# Patient Record
Sex: MALE | Race: WHITE | NOT HISPANIC OR LATINO | Employment: FULL TIME | URBAN - METROPOLITAN AREA
[De-identification: names, ages, dates, MRNs, and addresses within clinical notes are randomized per-mention and may not be internally consistent; named-entity substitution may affect disease eponyms.]

---

## 2017-06-12 DIAGNOSIS — I10 ESSENTIAL HYPERTENSION WITH GOAL BLOOD PRESSURE LESS THAN 140/90: ICD-10-CM

## 2017-06-13 RX ORDER — LISINOPRIL 20 MG/1
TABLET ORAL
Qty: 90 TABLET | Refills: 1 | Status: SHIPPED | OUTPATIENT
Start: 2017-06-13 | End: 2017-12-23

## 2017-06-13 RX ORDER — HYDROCHLOROTHIAZIDE 25 MG/1
25 TABLET ORAL DAILY
Qty: 90 TABLET | Refills: 1 | Status: SHIPPED | OUTPATIENT
Start: 2017-06-13 | End: 2018-01-02 | Stop reason: SINTOL

## 2017-06-13 RX ORDER — ATENOLOL 100 MG/1
TABLET ORAL
Qty: 90 TABLET | Refills: 1 | Status: SHIPPED | OUTPATIENT
Start: 2017-06-13 | End: 2017-12-23

## 2017-09-01 ENCOUNTER — OFFICE VISIT (OUTPATIENT)
Dept: URGENT CARE | Facility: URGENT CARE | Age: 67
End: 2017-09-01
Payer: COMMERCIAL

## 2017-09-01 VITALS
OXYGEN SATURATION: 98 % | SYSTOLIC BLOOD PRESSURE: 138 MMHG | DIASTOLIC BLOOD PRESSURE: 90 MMHG | RESPIRATION RATE: 16 BRPM | HEART RATE: 87 BPM

## 2017-09-01 DIAGNOSIS — T63.441A BEE STING-INDUCED ANAPHYLAXIS, ACCIDENTAL OR UNINTENTIONAL, INITIAL ENCOUNTER: ICD-10-CM

## 2017-09-01 DIAGNOSIS — T78.2XXA ANAPHYLAXIS, INITIAL ENCOUNTER: Primary | ICD-10-CM

## 2017-09-01 DIAGNOSIS — S60.469A INSECT BITE OF FINGER, INITIAL ENCOUNTER: ICD-10-CM
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
## 2017-09-01 DIAGNOSIS — R22.0 SEVERE TONGUE SWELLING: ICD-10-CM

## 2017-09-01 DIAGNOSIS — T78.2XXA BEE STING-INDUCED ANAPHYLAXIS, ACCIDENTAL OR UNINTENTIONAL, INITIAL ENCOUNTER: ICD-10-CM

## 2017-09-01 PROCEDURE — 99215 OFFICE O/P EST HI 40 MIN: CPT | Mod: 25 | Performed by: FAMILY MEDICINE

## 2017-09-01 PROCEDURE — 96372 THER/PROPH/DIAG INJ SC/IM: CPT | Performed by: FAMILY MEDICINE

## 2017-09-01 RX ORDER — METHYLPREDNISOLONE SODIUM SUCCINATE 125 MG/2ML
125 INJECTION, POWDER, LYOPHILIZED, FOR SOLUTION INTRAMUSCULAR; INTRAVENOUS ONCE
Qty: 2 ML | Refills: 0 | OUTPATIENT
Start: 2017-09-01 | End: 2017-09-01

## 2017-09-01 NOTE — NURSING NOTE
The following medication was given:     MEDICATION: epipen auto inject 0.3mg  ROUTE: IM  SITE: Thigh - Left  DOSE: 1 dose  LOT #: [id number]  :  Edd  EXPIRATION DATE:  4/18  Jaimee Maldonado MA

## 2017-09-01 NOTE — PROGRESS NOTES
HPI:Miko Gomez is a 66 year old male here today with complaint of tongue swelling    Patient was bit by a bee on the left pointer finger an hour prior to arrival.  Patient cleaned it and he tried sucking the venom off   Within 20 minutes of trying to suck the venom off, his tongue swelled up.  He denies any wheezing although he is worried that he may be getting shortness of breath  No chest pain  No fevers  No abdominal pain  No nausea or vomiting  No hives  No numbness or tingling    He tried to call the nurse line but was worried he couldn't talk so ended up driving here instead    Tongue is 3 x the normal size     Allergies   Allergen Reactions     Nkda [No Known Drug Allergies]        Past Medical History:   Diagnosis Date     HTN          Current Outpatient Prescriptions on File Prior to Visit:  atenolol (TENORMIN) 100 MG tablet TAKE ONE TABLET BY MOUTH EVERY DAY   lisinopril (PRINIVIL/ZESTRIL) 20 MG tablet TAKE ONE TABLET BY MOUTH EVERY DAY   hydrochlorothiazide (HYDRODIURIL) 25 MG tablet Take 1 tablet (25 mg) by mouth daily   aspirin (SB LOW DOSE ASA EC) 81 MG EC tablet Take 81 mg by mouth daily.   Multiple Vitamins-Minerals (CENTRUM SILVER) CHEW Take  by mouth.     No current facility-administered medications on file prior to visit.     Social History   Substance Use Topics     Smoking status: Current Every Day Smoker     Packs/day: 1.00     Years: 40.00     Types: Cigarettes     Smokeless tobacco: Never Used     Alcohol use Yes      Comment: CHERYL EVERYDAY       ROS:  10 point review of systems negative except for noted above.   No thoughts of harming self or others.     OBJECTIVE:  /90  Pulse 87  Resp 16  SpO2 98%   General:   awake, alert, and cooperative.    Head: Normocephalic, atraumatic.  Eyes: Conjunctiva clear,   Mouth: no trismus, tongue is swollen and very thick patient having difficulty talking and articulating because of the swelling  Posterior pharyngeal wall still good and  no swelling of the uvula.  Heart: Regular rate and rhythm. No murmur.  Lungs: Chest is clear; no wheezes or rales.   Abdomen: soft non-tender.  Neuro: Alert and oriented - normal speech.  MS: Using extremities freely  PSYCH:  Normal affect, normal speech  SKIN: no obvious rashes    ASSESSMENT:    ICD-10-CM    1. Anaphylaxis, initial encounter T78.2XXA methylPREDNISolone sodium succinate (SOLU-MEDROL) 125 mg/2 mL injection     METHYLPRED 125 MG SOD INJ     C ADRENALIN EPINEPHRINE 0.1 MG INJECTION   2. Severe tongue swelling R22.0    3. Bee sting-induced anaphylaxis, accidental or unintentional, initial encounter T63.441A    4. Insect bite of finger, initial encounter S60.469A     W57.XXXA        PLAN:     Patient was in severe tongue swelling with risk of respiratory distress.  Immediate administration of epipen done in clinic as well as solumedrol.  911 activated and Ambulance came to scene. We were planning on giving benadryl but ambulance have took over and stated they will give on route.  He left by ambulance. [facility] ambulance [facility]

## 2017-09-01 NOTE — Clinical Note
Hi, I'm copying you because I couldn't find Sola Tavarez in epic. But can you  review this. I put a charge on epipen injection but there was no corresponding ndc because it was an epipen injector so I just selected an ndc code that was blank and then wrote it in comments. Does that affect billing? Otherwise not sure how else to put in the epipen

## 2017-09-01 NOTE — MR AVS SNAPSHOT
After Visit Summary   9/1/2017    Miko Gomez    MRN: [id number]           Patient Information     Date Of Birth          1950        Visit Information        Provider Department      9/1/2017 6:10 PM Viviana Whitmore MD [facility]        Today's Diagnoses     Anaphylaxis, initial encounter    -  1    Severe tongue swelling        Bee sting-induced anaphylaxis, accidental or unintentional, initial encounter        Insect bite of finger, initial encounter           Follow-ups after your visit        Who to contact     If you have questions or need follow up information about today's clinic visit or your schedule please contact [facility] directly at [phone].  Normal or non-critical lab and imaging results will be communicated to you by MyChart, letter or phone within 4 business days after the clinic has received the results. If you do not hear from us within 7 days, please contact the clinic through [facility] or phone. If you have a critical or abnormal lab result, we will notify you by phone as soon as possible.  Submit refill requests through [employer] or call your pharmacy and they will forward the refill request to us. Please allow 3 business days for your refill to be completed.          Additional Information About Your Visit        MyChart Information     [employer] gives you secure access to your electronic health record. If you see a primary care provider, you can also send messages to your care team and make appointments. If you have questions, please call your primary care clinic.  If you do not have a primary care provider, please call [phone] and they will assist you.        Care EveryWhere ID     This is your Care EveryWhere ID. This could be used by other organizations to access your [place] medical records  [id number]        Your Vitals Were     Pulse Respirations Pulse Oximetry             87 16 98%          Blood Pressure from  Last 3 Encounters:   09/01/17 138/90   11/01/16 124/83   02/17/16 138/89    Weight from Last 3 Encounters:   11/01/16 151 lb 12.8 oz (68.9 kg)   09/14/15 151 lb (68.5 kg)   02/05/15 152 lb (68.9 kg)              We Performed the Following     C ADRENALIN EPINEPHRINE 0.1 MG INJECTION     METHYLPRED 125 MG SOD INJ          Today's Medication Changes          These changes are accurate as of: 9/1/17  6:51 PM.  If you have any questions, ask your nurse or doctor.               Start taking these medicines.        Dose/Directions    methylPREDNISolone sodium succinate 125 mg/2 mL injection   Commonly known as:  SOLU-MEDROL   Used for:  Anaphylaxis, initial encounter   Started by:  Viviana Whitmore MD        Dose:  125 mg   Inject 2 mLs (125 mg) into the vein once for 1 dose   Quantity:  2 mL   Refills:  0            Where to get your medicines      Some of these will need a paper prescription and others can be bought over the counter.  Ask your nurse if you have questions.     You don't need a prescription for these medications     methylPREDNISolone sodium succinate 125 mg/2 mL injection                Primary Care Provider Office Phone # Fax #    Virgil Sree Ledbetter [phone] [address] [facility] [address]        Equal Access to Services     URDDY VALLEJO : Usha haines hadasho Soomaali, waaxda luqadaha, qaybta kaalmada adeegyada, waxisrael alvarenga. So [facility] [phone].    ATENCIÓN: Si habla español, tiene a jacobs disposición servicios gratuitos de asistencia lingüística. Llame al [phone].    We comply with applicable federal civil rights laws and [place] laws. We do not discriminate on the basis of race, color, national origin, age, disability sex, sexual orientation or gender identity.            Thank you!     Thank you for choosing [facility]  for your care. Our goal is always to provide you with excellent care. Hearing back from  our patients is one way we can continue to improve our services. Please take a few minutes to complete the written survey that you may receive in the mail after your visit with us. Thank you!             Your Updated Medication List - Protect others around you: Learn how to safely use, store and throw away your medicines at www.disposemymeds.org.          This list is accurate as of: 9/1/17  6:51 PM.  Always use your most recent med list.                   Brand Name Dispense Instructions for use Diagnosis    atenolol 100 MG tablet    TENORMIN    90 tablet    TAKE ONE TABLET BY MOUTH EVERY DAY    Essential hypertension with goal blood pressure less than 140/90       CENTRUM SILVER Chew      Take  by mouth.        hydrochlorothiazide 25 MG tablet    HYDRODIURIL    90 tablet    Take 1 tablet (25 mg) by mouth daily    Essential hypertension with goal blood pressure less than 140/90       lisinopril 20 MG tablet    PRINIVIL/ZESTRIL    90 tablet    TAKE ONE TABLET BY MOUTH EVERY DAY    Essential hypertension with goal blood pressure less than 140/90       methylPREDNISolone sodium succinate 125 mg/2 mL injection    SOLU-MEDROL    2 mL    Inject 2 mLs (125 mg) into the vein once for 1 dose    Anaphylaxis, initial encounter       SB LOW DOSE ASA EC 81 MG EC tablet   Generic drug:  aspirin      Take 81 mg by mouth daily.

## 2017-09-02 NOTE — NURSING NOTE
The following medication was given:     MEDICATION: Methylprednisolone (solu-medrol) 125mg  ROUTE: IM  SITE: Vastus Lateralis - Right  DOSE: 2ml  LOT #: [id number]  :  [employer]  EXPIRATION DATE:  12/2019  NDC#: 0623-0682-36    Given at 6:15pm    Indiana Medina MA

## 2017-12-23 DIAGNOSIS — I10 ESSENTIAL HYPERTENSION WITH GOAL BLOOD PRESSURE LESS THAN 140/90: ICD-10-CM

## 2017-12-23 NOTE — LETTER
December 27, 2017    Miko Gomez  [address]  [address]  [place] [address]    Dear Miko,       We recently received a refill request for lisinopril and atenolol.  We have refilled this for a one time 30 day supply only because you are due for a:    Blood pressure office visit with provider and fasting lab appointment      Please schedule this lab appointment 4-5 days prior to the office visit.     Please call at your earliest convenience so that there will not be a delay with your future refills.          Thank you,   Your [facility] Team/  [phone]

## 2017-12-26 RX ORDER — ATENOLOL 100 MG/1
TABLET ORAL
Qty: 30 TABLET | Refills: 0 | Status: SHIPPED | OUTPATIENT
Start: 2017-12-26 | End: 2018-01-02

## 2017-12-26 RX ORDER — LISINOPRIL 20 MG/1
TABLET ORAL
Qty: 30 TABLET | Refills: 0 | Status: SHIPPED | OUTPATIENT
Start: 2017-12-26 | End: 2018-01-02

## 2017-12-27 NOTE — TELEPHONE ENCOUNTER
Due for office visit and labs for HTN. Kelin  Refill one month given.  please send letter. Tiffany Espinosa RN

## 2018-01-02 ENCOUNTER — OFFICE VISIT (OUTPATIENT)
Dept: FAMILY MEDICINE | Facility: CLINIC | Age: 68
End: 2018-01-02
Payer: COMMERCIAL

## 2018-01-02 VITALS
HEART RATE: 66 BPM | DIASTOLIC BLOOD PRESSURE: 104 MMHG | TEMPERATURE: 98.3 F | SYSTOLIC BLOOD PRESSURE: 170 MMHG | WEIGHT: 154 LBS | HEIGHT: 66 IN | BODY MASS INDEX: 24.75 KG/M2 | OXYGEN SATURATION: 98 %

## 2018-01-02 DIAGNOSIS — T63.441A BEE STING REACTION, ACCIDENTAL OR UNINTENTIONAL, INITIAL ENCOUNTER: Primary | ICD-10-CM

## 2018-01-02 DIAGNOSIS — I10 ESSENTIAL HYPERTENSION WITH GOAL BLOOD PRESSURE LESS THAN 140/90: ICD-10-CM

## 2018-01-02 PROBLEM — Z71.89 ADVANCED DIRECTIVES, COUNSELING/DISCUSSION: Status: ACTIVE | Noted: 2018-01-02

## 2018-01-02 PROCEDURE — 99214 OFFICE O/P EST MOD 30 MIN: CPT | Performed by: FAMILY MEDICINE

## 2018-01-02 RX ORDER — LISINOPRIL 20 MG/1
20 TABLET ORAL DAILY
Qty: 90 TABLET | Refills: 1 | Status: SHIPPED | OUTPATIENT
Start: 2018-01-02 | End: 2018-10-02

## 2018-01-02 RX ORDER — ATENOLOL 100 MG/1
100 TABLET ORAL DAILY
Qty: 90 TABLET | Refills: 1 | Status: SHIPPED | OUTPATIENT
Start: 2018-01-02 | End: 2018-10-02

## 2018-01-02 NOTE — PROGRESS NOTES
SUBJECTIVE:                                                    Miko Gomez is a 67 year old male who presents to clinic today for the following health issues:      History of Present Illness     Hypertension:     Outpatient blood pressures:  Are not being checked    Dietary sodium intake::  Low salt diet    Diet:  Low salt  Frequency of exercise:  1 day/week  Duration of exercise:  Other  Taking medications regularly:  Yes  Medication side effects:  Muscle aches  Additional concerns today:  YES    Answers for HPI/ROS submitted by the patient on 1/2/2018   PHQ-2 Score: 0  Patient has had a anaphylactic reaction to bee sting.  Treated with epipen and caused arrhythmia causing him to be hospitalized.  SUBJECTIVE:  67 year oldyear old male enters with a hx of hypertension.  Pt. Has been compliant with medications and medications were reviewed.  No side effects. No chest pain or sob. Low sodium diet.    Current Outpatient Prescriptions:      atenolol (TENORMIN) 100 MG tablet, TAKE ONE TABLET BY MOUTH EVERY DAY, Disp: 30 tablet, Rfl: 0     lisinopril (PRINIVIL/ZESTRIL) 20 MG tablet, TAKE ONE TABLET BY MOUTH EVERY DAY, Disp: 30 tablet, Rfl: 0     aspirin (SB LOW DOSE ASA EC) 81 MG EC tablet, Take 81 mg by mouth daily., Disp: , Rfl:      Multiple Vitamins-Minerals (CENTRUM SILVER) CHEW, Take  by mouth., Disp: , Rfl:   Past Medical History:   Diagnosis Date     HTN      Orders Only on 10/25/2016   Component Date Value Ref Range Status     Cholesterol 10/25/2016 210* <200 mg/dL Final    Desirable:       <200 mg/dl     Triglycerides 10/25/2016 114  <150 mg/dL Final    Fasting specimen     HDL Cholesterol 10/25/2016 55  >39 mg/dL Final     LDL Cholesterol Calculated 10/25/2016 132* <100 mg/dL Final    Comment: Above desirable:  100-129 mg/dl   Borderline High:  130-159 mg/dL   High:             160-189 mg/dL   Very high:       >189 mg/dl       Non HDL Cholesterol 10/25/2016 155* <130 mg/dL Final    Comment: Above  "Desirable:  130-159 mg/dl   Borderline high:  160-189 mg/dl   High:             190-219 mg/dl   Very high:       >219 mg/dl       Sodium 10/25/2016 137  133 - 144 mmol/L Final     Potassium 10/25/2016 4.2  3.4 - 5.3 mmol/L Final     Chloride 10/25/2016 100  94 - 109 mmol/L Final     Carbon Dioxide 10/25/2016 28  20 - 32 mmol/L Final     Anion Gap 10/25/2016 9  3 - 14 mmol/L Final     Glucose 10/25/2016 98  70 - 99 mg/dL Final     Urea Nitrogen 10/25/2016 13  7 - 30 mg/dL Final     Creatinine 10/25/2016 0.92  0.66 - 1.25 mg/dL Final     GFR Estimate 10/25/2016 83  >60 mL/min/1.7m2 Final    Non  GFR Calc     GFR Estimate If Black 10/25/2016   >60 mL/min/1.7m2 Final                    Value:>90   GFR Calc       Calcium 10/25/2016 8.9  8.5 - 10.1 mg/dL Final      Reviewed health maintenance  Patient Active Problem List   Diagnosis     CARDIOVASCULAR SCREENING; LDL GOAL LESS THAN 130     Hypertension goal BP (blood pressure) < 140/90     Advanced directives, counseling/discussion         OBJECTIVE:  no apparent distress  BP (!) 170/104  Pulse 66  Temp 98.3  F (36.8  C) (Oral)  Ht 5' 6\" (1.676 m)  Wt 154 lb (69.9 kg)  SpO2 98%  BMI 24.86 kg/m2     Head: Normocephalic. No masses, lesions, tenderness or abnormalities.  Neck::Neck supple. No adenopathy. Thyroid symmetric, normal size.    Cardiovascular: negative. No lifts, heaves, or thrills. RRR. No murmurs, clicks gallops or rubs  Respiratory. Good diaphragmatic excursion. Lungs clear  Gastrointestinal:Abdomen soft, non-tender.  No masses, organomegaly    Orders Only on 10/25/2016   Component Date Value Ref Range Status     Cholesterol 10/25/2016 210* <200 mg/dL Final    Desirable:       <200 mg/dl     Triglycerides 10/25/2016 114  <150 mg/dL Final    Fasting specimen     HDL Cholesterol 10/25/2016 55  >39 mg/dL Final     LDL Cholesterol Calculated 10/25/2016 132* <100 mg/dL Final    Comment: Above desirable:  100-129 mg/dl   " Borderline High:  130-159 mg/dL   High:             160-189 mg/dL   Very high:       >189 mg/dl       Non HDL Cholesterol 10/25/2016 155* <130 mg/dL Final    Comment: Above Desirable:  130-159 mg/dl   Borderline high:  160-189 mg/dl   High:             190-219 mg/dl   Very high:       >219 mg/dl       Sodium 10/25/2016 137  133 - 144 mmol/L Final     Potassium 10/25/2016 4.2  3.4 - 5.3 mmol/L Final     Chloride 10/25/2016 100  94 - 109 mmol/L Final     Carbon Dioxide 10/25/2016 28  20 - 32 mmol/L Final     Anion Gap 10/25/2016 9  3 - 14 mmol/L Final     Glucose 10/25/2016 98  70 - 99 mg/dL Final     Urea Nitrogen 10/25/2016 13  7 - 30 mg/dL Final     Creatinine 10/25/2016 0.92  0.66 - 1.25 mg/dL Final     GFR Estimate 10/25/2016 83  >60 mL/min/1.7m2 Final    Non  GFR Calc     GFR Estimate If Black 10/25/2016   >60 mL/min/1.7m2 Final                    Value:>90   GFR Calc       Calcium 10/25/2016 8.9  8.5 - 10.1 mg/dL Final         ICD-10-CM    1. Bee sting reaction, accidental or unintentional, initial encounter T63.441A    2. Essential hypertension with goal blood pressure less than 140/90 I10 atenolol (TENORMIN) 100 MG tablet     lisinopril (PRINIVIL/ZESTRIL) 20 MG tablet    PLAN: patient will get home blood pressures and let me know in a week and if over 140/90 then use amlodipine.  Consider stress test when blood pressure under control

## 2018-01-02 NOTE — MR AVS SNAPSHOT
After Visit Summary   1/2/2018    Miko Gomez    MRN: [id number]           Patient Information     Date Of Birth          1950        Visit Information        Provider Department      1/2/2018 8:45 AM Virgil Ledbetter MD [facility]        Today's Diagnoses     Bee sting reaction, accidental or unintentional, initial encounter    -  1    Essential hypertension with goal blood pressure less than 140/90           Follow-ups after your visit        Additional Services     ALLERGY/ASTHMA ADULT REFERRAL       Your provider has referred you to: ADRIÁN: [facility]  [phone] [email or web address]    Please be aware that coverage of these services is subject to the terms and limitations of your health insurance plan.  Call member services at your health plan with any benefit or coverage questions.      Please bring the following with you to your appointment:    (1) Any X-Rays, CTs or MRIs which have been performed.  Contact the facility where they were done to arrange for  prior to your scheduled appointment.    (2) List of current medications  (3) This referral request   (4) Any documents/labs given to you for this referral                  Future tests that were ordered for you today     Open Future Orders        Priority Expected Expires Ordered    Basic metabolic panel  (Ca, Cl, CO2, Creat, Gluc, K, Na, BUN) Routine  1/2/2019 1/2/2018    Lipid panel reflex to direct LDL Fasting Routine  1/2/2019 1/2/2018            Who to contact     If you have questions or need follow up information about today's clinic visit or your schedule please contact [facility] directly at [phone].  Normal or non-critical lab and imaging results will be communicated to you by MyChart, letter or phone within 4 business days after the clinic has received the results. If you do not hear from us within 7 days, please contact the  "clinic through [email or web address] or phone. If you have a critical or abnormal lab result, we will notify you by phone as soon as possible.  Submit refill requests through [email or web address] or call your pharmacy and they will forward the refill request to us. Please allow 3 business days for your refill to be completed.          Additional Information About Your Visit        [employer] Information     [email or web address] gives you secure access to your electronic health record. If you see a primary care provider, you can also send messages to your care team and make appointments. If you have questions, please call your primary care clinic.  If you do not have a primary care provider, please call [phone] and they will assist you.        Care EveryWhere ID     This is your Care EveryWhere ID. This could be used by other organizations to access your [place] medical records  [id number]        Your Vitals Were     Pulse Temperature Height Pulse Oximetry BMI (Body Mass Index)       66 98.3  F (36.8  C) (Oral) 5' 6\" (1.676 m) 98% 24.86 kg/m2        Blood Pressure from Last 3 Encounters:   01/02/18 (!) 170/104   09/01/17 138/90   11/01/16 124/83    Weight from Last 3 Encounters:   01/02/18 154 lb (69.9 kg)   11/01/16 151 lb 12.8 oz (68.9 kg)   09/14/15 151 lb (68.5 kg)              We Performed the Following     ALLERGY/ASTHMA ADULT REFERRAL          Today's Medication Changes          These changes are accurate as of: 1/2/18  9:23 AM.  If you have any questions, ask your nurse or doctor.               These medicines have changed or have updated prescriptions.        Dose/Directions    atenolol 100 MG tablet   Commonly known as:  TENORMIN   This may have changed:  See the new instructions.   Used for:  Essential hypertension with goal blood pressure less than 140/90   Changed by:  Virgil Ledbetter MD        Dose:  100 mg   Take 1 tablet (100 mg) by mouth daily   Quantity:  90 tablet   Refills:  1       lisinopril 20 MG tablet   Commonly known " as:  PRINIVIL/ZESTRIL   This may have changed:  See the new instructions.   Used for:  Essential hypertension with goal blood pressure less than 140/90   Changed by:  Virgil Ledbetter MD        Dose:  20 mg   Take 1 tablet (20 mg) by mouth daily   Quantity:  90 tablet   Refills:  1         Stop taking these medicines if you haven't already. Please contact your care team if you have questions.     hydrochlorothiazide 25 MG tablet   Commonly known as:  HYDRODIURIL   Stopped by:  Virgil Ledbetter MD                Where to get your medicines      These medications were sent to [facility] [address]  [address] [address], [address], [place] [address]     Phone:  [phone]     atenolol 100 MG tablet    lisinopril 20 MG tablet                Primary Care Provider Office Phone # Fax #    Virgil Ledbetter [phone] [address] [facility] [facility] [address]        Equal Access to Services     [facility] AH: Hadii aad ku hadasho Soomaali, waaxda luqadaha, qaybta kaalmada adeegyada, waxay idiin hayaan adeeg kharahilary la'min . So [facility] [phone].    ATENCIÓN: Si habla español, tiene a jacobs disposición servicios gratuitos de asistencia lingüística. [facility] [phone].    We comply with applicable federal civil rights laws and [place] laws. We do not discriminate on the basis of race, color, national origin, age, disability, sex, sexual orientation, or gender identity.            Thank you!     Thank you for choosing [facility]  for your care. Our goal is always to provide you with excellent care. Hearing back from our patients is one way we can continue to improve our services. Please take a few minutes to complete the written survey that you may receive in the mail after your visit with us. Thank you!             Your Updated Medication List - Protect others around you: Learn how to safely use, store and throw away your  medicines at www.disposemymeds.org.          This list is accurate as of: 1/2/18  9:23 AM.  Always use your most recent med list.                   Brand Name Dispense Instructions for use Diagnosis    atenolol 100 MG tablet    TENORMIN    90 tablet    Take 1 tablet (100 mg) by mouth daily    Essential hypertension with goal blood pressure less than 140/90       CENTRUM SILVER Chew      Take  by mouth.        lisinopril 20 MG tablet    PRINIVIL/ZESTRIL    90 tablet    Take 1 tablet (20 mg) by mouth daily    Essential hypertension with goal blood pressure less than 140/90       SB LOW DOSE ASA EC 81 MG EC tablet   Generic drug:  aspirin      Take 81 mg by mouth daily.

## 2018-01-02 NOTE — NURSING NOTE
"Chief Complaint   Patient presents with     Medication Problem     discontinued hctz due to muscle cramps in legs that would not release.         Initial BP (!) 170/104  Pulse 66  Temp 98.3  F (36.8  C) (Oral)  Ht 5' 6\" (1.676 m)  Wt 154 lb (69.9 kg)  SpO2 98%  BMI 24.86 kg/m2 Estimated body mass index is 24.86 kg/(m^2) as calculated from the following:    Height as of this encounter: 5' 6\" (1.676 m).    Weight as of this encounter: 154 lb (69.9 kg).  Medication Reconciliation: complete  Stu Bain CMA    "

## 2018-01-02 NOTE — LETTER
[facility]  [address] [facility] [id number] [phone]        January 7, 2019    Miko Gomez  [address] [address] [place] [id number]              Dear Miko Gomez    This is to remind you that your Fasting lab is due.    You may call our office at [phone] to schedule an appointment.    Please disregard this notice if you have already had your labs drawn or made an appointment.        Sincerely,        Virgil Ledbetter MD

## 2018-02-12 ENCOUNTER — DOCUMENTATION ONLY (OUTPATIENT)
Dept: VASCULAR SURGERY | Facility: CLINIC | Age: 68
End: 2018-02-12

## 2018-02-12 DIAGNOSIS — Z72.0 CURRENT TOBACCO USE: Primary | ICD-10-CM

## 2018-02-12 DIAGNOSIS — Z13.6 ENCOUNTER FOR ABDOMINAL AORTIC ANEURYSM (AAA) SCREENING: Primary | ICD-10-CM

## 2018-02-28 ENCOUNTER — TELEPHONE (OUTPATIENT)
Dept: FAMILY MEDICINE | Facility: CLINIC | Age: 68
End: 2018-02-28

## 2018-03-01 ENCOUNTER — TELEPHONE (OUTPATIENT)
Dept: FAMILY MEDICINE | Facility: CLINIC | Age: 68
End: 2018-03-01

## 2018-06-06 ENCOUNTER — TELEPHONE (OUTPATIENT)
Dept: FAMILY MEDICINE | Facility: CLINIC | Age: 68
End: 2018-06-06

## 2018-06-06 NOTE — LETTER
Miko Gomez  [address] [address]  [address]  [place] [address]      June 6, 2018      Dear Miko,      Our records indicate that you have not scheduled for a(n)appointment with Virgil Ledbetter MD, Fasting bloodwork and Blood pressure check  which was recommended by your health care team. Monitoring and managing your preventative and chronic health conditions are very important to us.       If you have received your health care elsewhere, please provide us with that information so it can be documented in your chart.    Please call [phone] or message us through your [employer] account to schedule an appointment or provide information for your chart.     We look forward to seeing you and working with you on your health care needs.     Sincerely,   Virgil Ledbetter MD          *If you have already scheduled an appointment, please disregard this reminder

## 2018-06-06 NOTE — TELEPHONE ENCOUNTER
Panel Management Review      Patient has the following on his problem list:     Hypertension   Last three blood pressure readings:  BP Readings from Last 3 Encounters:   01/02/18 (!) 170/104   09/01/17 138/90   11/01/16 124/83     Blood pressure: FAILED    HTN Guidelines:  Age 18-59 BP range:  Less than 140/90  Age 60-85 with Diabetes:  Less than 140/90  Age 60-85 without Diabetes:  less than 150/90      Composite cancer screening  Chart review shows that this patient is due/due soon for the following None  Summary:    Patient is due/failing the following:   BP CHECK    Action needed:   Patient needs office visit for Hypertension.    Type of outreach:    Sent to provider to see what to do    Questions for provider review:    PLAN: patient will get home blood pressures and let me know in a week and if over 140/90 then use amlodipine.  Consider stress test when blood pressure under control.     What would you like to do?                                                                                                                                     ZEFERINO Mathur       Chart routed to Provider .        Needs follow up next month  Virgil Ledbetter

## 2018-08-17 ENCOUNTER — TELEPHONE (OUTPATIENT)
Dept: FAMILY MEDICINE | Facility: CLINIC | Age: 68
End: 2018-08-17

## 2018-08-17 DIAGNOSIS — T63.441A BEE STING REACTION, ACCIDENTAL OR UNINTENTIONAL, INITIAL ENCOUNTER: Primary | ICD-10-CM

## 2018-08-17 RX ORDER — EPINEPHRINE 0.3 MG/.3ML
0.3 INJECTION SUBCUTANEOUS
Qty: 0.3 ML | Refills: 1 | Status: SHIPPED | OUTPATIENT
Start: 2018-08-17 | End: 2018-10-10

## 2018-08-17 NOTE — TELEPHONE ENCOUNTER
Patient is requesting refill of Epi-pen.  Was given by hospitalist last year and is about to .  Would like rx sent to [place].    Thank you,  Susan Perez, [facility]  [phone]

## 2018-10-01 ENCOUNTER — TELEPHONE (OUTPATIENT)
Dept: FAMILY MEDICINE | Facility: CLINIC | Age: 68
End: 2018-10-01

## 2018-10-01 NOTE — TELEPHONE ENCOUNTER
Panel Management Review      Patient has the following on his problem list:     Hypertension   Last three blood pressure readings:  BP Readings from Last 3 Encounters:   01/02/18 (!) 170/104   09/01/17 138/90   11/01/16 124/83     Blood pressure: FAILED    HTN Guidelines:  Age 18-59 BP range:  Less than 140/90  Age 60-85 with Diabetes:  Less than 140/90  Age 60-85 without Diabetes:  less than 150/90      Composite cancer screening  Chart review shows that this patient is due/due soon for the following None  Summary:    Patient is due/failing the following:   BP CHECK    Action needed:   Patient needs office visit for bp check.    Type of outreach:    None, routed to provider for review.    Questions for provider review:    The last office note states  PLAN: patient will get home blood pressures and let me know in a week and if over 140/90 then use amlodipine.  Consider stress test when blood pressure under control.  I do not see where he has done this.  What would you like to do?                                                                                                                                    Leah Becerra M.A.       Chart routed to Provider .

## 2018-10-10 ENCOUNTER — OFFICE VISIT (OUTPATIENT)
Dept: FAMILY MEDICINE | Facility: CLINIC | Age: 68
End: 2018-10-10
Payer: COMMERCIAL

## 2018-10-10 VITALS
TEMPERATURE: 98.6 F | WEIGHT: 149 LBS | HEART RATE: 86 BPM | OXYGEN SATURATION: 99 % | BODY MASS INDEX: 24.05 KG/M2 | RESPIRATION RATE: 18 BRPM | DIASTOLIC BLOOD PRESSURE: 105 MMHG | SYSTOLIC BLOOD PRESSURE: 160 MMHG

## 2018-10-10 DIAGNOSIS — T63.441A BEE STING REACTION, ACCIDENTAL OR UNINTENTIONAL, INITIAL ENCOUNTER: ICD-10-CM

## 2018-10-10 DIAGNOSIS — I10 ESSENTIAL HYPERTENSION WITH GOAL BLOOD PRESSURE LESS THAN 140/90: ICD-10-CM

## 2018-10-10 LAB
ANION GAP SERPL CALCULATED.3IONS-SCNC: 9 MMOL/L (ref 3–14)
BUN SERPL-MCNC: 8 MG/DL (ref 7–30)
CALCIUM SERPL-MCNC: 8.8 MG/DL (ref 8.5–10.1)
CHLORIDE SERPL-SCNC: 102 MMOL/L (ref 94–109)
CO2 SERPL-SCNC: 25 MMOL/L (ref 20–32)
CREAT SERPL-MCNC: 0.8 MG/DL (ref 0.66–1.25)
GFR SERPL CREATININE-BSD FRML MDRD: >90 ML/MIN/1.7M2
GLUCOSE SERPL-MCNC: 101 MG/DL (ref 70–99)
POTASSIUM SERPL-SCNC: 4.1 MMOL/L (ref 3.4–5.3)
SODIUM SERPL-SCNC: 136 MMOL/L (ref 133–144)

## 2018-10-10 PROCEDURE — 99213 OFFICE O/P EST LOW 20 MIN: CPT | Performed by: FAMILY MEDICINE

## 2018-10-10 PROCEDURE — 36415 COLL VENOUS BLD VENIPUNCTURE: CPT | Performed by: FAMILY MEDICINE

## 2018-10-10 PROCEDURE — 80048 BASIC METABOLIC PNL TOTAL CA: CPT | Performed by: FAMILY MEDICINE

## 2018-10-10 RX ORDER — ATENOLOL 100 MG/1
100 TABLET ORAL DAILY
Qty: 90 TABLET | Refills: 1 | Status: SHIPPED | OUTPATIENT
Start: 2018-10-10 | End: 2019-05-13

## 2018-10-10 RX ORDER — EPINEPHRINE 0.3 MG/.3ML
0.3 INJECTION SUBCUTANEOUS
Qty: 0.3 ML | Refills: 1 | Status: SHIPPED | OUTPATIENT
Start: 2018-10-10 | End: 2020-04-21

## 2018-10-10 RX ORDER — LISINOPRIL 20 MG/1
20 TABLET ORAL DAILY
Qty: 90 TABLET | Refills: 1 | Status: SHIPPED | OUTPATIENT
Start: 2018-10-10 | End: 2019-05-13

## 2018-10-10 ASSESSMENT — PAIN SCALES - GENERAL: PAINLEVEL: NO PAIN (0)

## 2018-10-10 NOTE — NURSING NOTE
"Chief Complaint   Patient presents with     Hypertension       Initial BP (!) 189/121  Pulse 86  Temp 98.6  F (37  C) (Oral)  Resp 18  Wt 149 lb (67.6 kg)  SpO2 99%  BMI 24.05 kg/m2 Estimated body mass index is 24.05 kg/(m^2) as calculated from the following:    Height as of 1/2/18: 5' 6\" (1.676 m).    Weight as of this encounter: 149 lb (67.6 kg).  Medication Reconciliation: complete  Leah Becerra M.A.    "

## 2018-10-10 NOTE — MR AVS SNAPSHOT
After Visit Summary   10/10/2018    Miko Gomez    MRN: [id number]           Patient Information     Date Of Birth          1950        Visit Information        Provider Department      10/10/2018 10:15 AM Virgil Ledbetter MD [facility]        Today's Diagnoses     Essential hypertension with goal blood pressure less than 140/90        Bee sting reaction, accidental or unintentional, initial encounter           Follow-ups after your visit        Follow-up notes from your care team     Return in about 6 months (around 4/10/2019).      Who to contact     If you have questions or need follow up information about today's clinic visit or your schedule please contact [facility] directly at [phone].  Normal or non-critical lab and imaging results will be communicated to you by MyChart, letter or phone within 4 business days after the clinic has received the results. If you do not hear from us within 7 days, please contact the clinic through [employer] or phone. If you have a critical or abnormal lab result, we will notify you by phone as soon as possible.  Submit refill requests through [employer] or call your pharmacy and they will forward the refill request to us. Please allow 3 business days for your refill to be completed.          Additional Information About Your Visit        MyChart Information     [employer] gives you secure access to your electronic health record. If you see a primary care provider, you can also send messages to your care team and make appointments. If you have questions, please call your primary care clinic.  If you do not have a primary care provider, please call [phone] and they will assist you.        Care EveryWhere ID     This is your Care EveryWhere ID. This could be used by other organizations to access your [place] medical records  [id number]        Your Vitals Were     Pulse Temperature Respirations Pulse Oximetry BMI  (Body Mass Index)       86 98.6  F (37  C) (Oral) 18 99% 24.05 kg/m2        Blood Pressure from Last 3 Encounters:   10/10/18 (!) 160/105   01/02/18 (!) 170/104   09/01/17 138/90    Weight from Last 3 Encounters:   10/10/18 149 lb (67.6 kg)   01/02/18 154 lb (69.9 kg)   11/01/16 151 lb 12.8 oz (68.9 kg)              We Performed the Following     Basic metabolic panel  (Ca, Cl, CO2, Creat, Gluc, K, Na, BUN)          Today's Medication Changes          These changes are accurate as of 10/10/18 10:43 AM.  If you have any questions, ask your nurse or doctor.               These medicines have changed or have updated prescriptions.        Dose/Directions    atenolol 100 MG tablet   Commonly known as:  TENORMIN   This may have changed:  See the new instructions.   Used for:  Essential hypertension with goal blood pressure less than 140/90   Changed by:  Virgil Ledbetter MD        Dose:  100 mg   Take 1 tablet (100 mg) by mouth daily   Quantity:  90 tablet   Refills:  1       lisinopril 20 MG tablet   Commonly known as:  PRINIVIL/ZESTRIL   This may have changed:  See the new instructions.   Used for:  Essential hypertension with goal blood pressure less than 140/90   Changed by:  Virgil Ledbetter MD        Dose:  20 mg   Take 1 tablet (20 mg) by mouth daily   Quantity:  90 tablet   Refills:  1            Where to get your medicines      [facility] [address] [address] [facility]  [address] [address]     Phone:  [phone]     atenolol 100 MG tablet    EPINEPHrine 0.3 MG/0.3ML injection 2-pack    lisinopril 20 MG tablet                Primary Care Provider Office Phone # Fax #    Virgil Ledbetter [phone] [address] [facility] [address]        Equal Access to Services     BROOK VALLEJO AH: Usha bridgeso Soomaali, waaxda luqadaha, qaybta kaalmaegan chávez, antonio oropeza  melvinanataliehilary marquez ah. So [facility] [phone].    ATENCIÓN: Si habla rui, tiene a jacobs disposición servicios gratuitos de asistencia lingüística. Hina bloom [phone].    We comply with applicable federal civil rights laws and [place] laws. We do not discriminate on the basis of race, color, national origin, age, disability, sex, sexual orientation, or gender identity.            Thank you!     Thank you for choosing [facility]  for your care. Our goal is always to provide you with excellent care. Hearing back from our patients is one way we can continue to improve our services. Please take a few minutes to complete the written survey that you may receive in the mail after your visit with us. Thank you!             Your Updated Medication List - Protect others around you: Learn how to safely use, store and throw away your medicines at www.disposemymeds.org.          This list is accurate as of 10/10/18 10:43 AM.  Always use your most recent med list.                   Brand Name Dispense Instructions for use Diagnosis    atenolol 100 MG tablet    TENORMIN    90 tablet    Take 1 tablet (100 mg) by mouth daily    Essential hypertension with goal blood pressure less than 140/90       CENTRUM SILVER Chew      Take  by mouth.        EPINEPHrine 0.3 MG/0.3ML injection 2-pack    EPIPEN/ADRENACLICK/or ANY BX GENERIC EQUIV    0.3 mL    Inject 0.3 mLs (0.3 mg) into the muscle once as needed    Bee sting reaction, accidental or unintentional, initial encounter       lisinopril 20 MG tablet    PRINIVIL/ZESTRIL    90 tablet    Take 1 tablet (20 mg) by mouth daily    Essential hypertension with goal blood pressure less than 140/90       SB LOW DOSE ASA EC 81 MG EC tablet   Generic drug:  aspirin      Take 81 mg by mouth daily.

## 2018-10-10 NOTE — PROGRESS NOTES
SUBJECTIVE:  67 year oldyear old male enters with  hypertension.  Pt. Has been  noncompliant with medications for one week and medications were reviewed.  No side effects. No chest pain or sob. Low sodium diet.    Current Outpatient Prescriptions:      aspirin (SB LOW DOSE ASA EC) 81 MG EC tablet, Take 81 mg by mouth daily., Disp: , Rfl:      atenolol (TENORMIN) 100 MG tablet, TAKE ONE TABLET BY MOUTH EVERY DAY, Disp: 30 tablet, Rfl: 0     EPINEPHrine (EPIPEN/ADRENACLICK/OR ANY BX GENERIC EQUIV) 0.3 MG/0.3ML injection 2-pack, Inject 0.3 mLs (0.3 mg) into the muscle once as needed, Disp: 0.3 mL, Rfl: 1     lisinopril (PRINIVIL/ZESTRIL) 20 MG tablet, TAKE ONE TABLET BY MOUTH EVERY DAY, Disp: 30 tablet, Rfl: 0     Multiple Vitamins-Minerals (CENTRUM SILVER) CHEW, Take  by mouth., Disp: , Rfl:   Past Medical History:   Diagnosis Date     HTN      Orders Only on 10/25/2016   Component Date Value Ref Range Status     Cholesterol 10/25/2016 210* <200 mg/dL Final    Desirable:       <200 mg/dl     Triglycerides 10/25/2016 114  <150 mg/dL Final    Fasting specimen     HDL Cholesterol 10/25/2016 55  >39 mg/dL Final     LDL Cholesterol Calculated 10/25/2016 132* <100 mg/dL Final    Comment: Above desirable:  100-129 mg/dl   Borderline High:  130-159 mg/dL   High:             160-189 mg/dL   Very high:       >189 mg/dl       Non HDL Cholesterol 10/25/2016 155* <130 mg/dL Final    Comment: Above Desirable:  130-159 mg/dl   Borderline high:  160-189 mg/dl   High:             190-219 mg/dl   Very high:       >219 mg/dl       Sodium 10/25/2016 137  133 - 144 mmol/L Final     Potassium 10/25/2016 4.2  3.4 - 5.3 mmol/L Final     Chloride 10/25/2016 100  94 - 109 mmol/L Final     Carbon Dioxide 10/25/2016 28  20 - 32 mmol/L Final     Anion Gap 10/25/2016 9  3 - 14 mmol/L Final     Glucose 10/25/2016 98  70 - 99 mg/dL Final     Urea Nitrogen 10/25/2016 13  7 - 30 mg/dL Final     Creatinine 10/25/2016 0.92  0.66 - 1.25 mg/dL Final      GFR Estimate 10/25/2016 83  >60 mL/min/1.7m2 Final    Non  GFR Calc     GFR Estimate If Black 10/25/2016   >60 mL/min/1.7m2 Final                    Value:>90   GFR Calc       Calcium 10/25/2016 8.9  8.5 - 10.1 mg/dL Final      Reviewed health maintenance  Patient Active Problem List   Diagnosis     CARDIOVASCULAR SCREENING; LDL GOAL LESS THAN 130     Hypertension goal BP (blood pressure) < 140/90     Advanced directives, counseling/discussion         OBJECTIVE:  no apparent distress  BP (!) 160/105  Pulse 86  Temp 98.6  F (37  C) (Oral)  Resp 18  Wt 149 lb (67.6 kg)  SpO2 99%  BMI 24.05 kg/m2     Head: Normocephalic. No masses, lesions, tenderness or abnormalities.  Neck::Neck supple. No adenopathy. Thyroid symmetric, normal size.    Cardiovascular: negative. No lifts, heaves, or thrills. RRR. No murmurs, clicks gallops or rubs  Respiratory. Good diaphragmatic excursion. Lungs clear  Gastrointestinal:Abdomen soft, non-tender.  No masses, organomegaly    Orders Only on 10/25/2016   Component Date Value Ref Range Status     Cholesterol 10/25/2016 210* <200 mg/dL Final    Desirable:       <200 mg/dl     Triglycerides 10/25/2016 114  <150 mg/dL Final    Fasting specimen     HDL Cholesterol 10/25/2016 55  >39 mg/dL Final     LDL Cholesterol Calculated 10/25/2016 132* <100 mg/dL Final    Comment: Above desirable:  100-129 mg/dl   Borderline High:  130-159 mg/dL   High:             160-189 mg/dL   Very high:       >189 mg/dl       Non HDL Cholesterol 10/25/2016 155* <130 mg/dL Final    Comment: Above Desirable:  130-159 mg/dl   Borderline high:  160-189 mg/dl   High:             190-219 mg/dl   Very high:       >219 mg/dl       Sodium 10/25/2016 137  133 - 144 mmol/L Final     Potassium 10/25/2016 4.2  3.4 - 5.3 mmol/L Final     Chloride 10/25/2016 100  94 - 109 mmol/L Final     Carbon Dioxide 10/25/2016 28  20 - 32 mmol/L Final     Anion Gap 10/25/2016 9  3 - 14 mmol/L Final      Glucose 10/25/2016 98  70 - 99 mg/dL Final     Urea Nitrogen 10/25/2016 13  7 - 30 mg/dL Final     Creatinine 10/25/2016 0.92  0.66 - 1.25 mg/dL Final     GFR Estimate 10/25/2016 83  >60 mL/min/1.7m2 Final    Non  GFR Calc     GFR Estimate If Black 10/25/2016   >60 mL/min/1.7m2 Final                    Value:>90   GFR Calc       Calcium 10/25/2016 8.9  8.5 - 10.1 mg/dL Final         ICD-10-CM    1. Essential hypertension with goal blood pressure less than 140/90 I10 atenolol (TENORMIN) 100 MG tablet     lisinopril (PRINIVIL/ZESTRIL) 20 MG tablet   2. Bee sting reaction, accidental or unintentional, initial encounter T63.441A EPINEPHrine (EPIPEN/ADRENACLICK/OR ANY BX GENERIC EQUIV) 0.3 MG/0.3ML injection 2-pack    PLAN: get blood pressure at pharmacy in one week

## 2019-01-08 ENCOUNTER — TELEPHONE (OUTPATIENT)
Dept: FAMILY MEDICINE | Facility: CLINIC | Age: 69
End: 2019-01-08

## 2019-01-08 NOTE — LETTER
Miko Gomez  [address] [address] [place] [id number]          January 8, 2019          Dear Miko Gomez      Our records indicate that you have not scheduled for a(n)appointment with Virgil Ledbetter MD for a blood pressure recheck which was recommended by your health care team. Monitoring and managing your preventative and chronic health conditions are very important to us.       If you have received your health care elsewhere, please provide us with that information so it can be documented in your chart.    Please call [phone] or message us through your [employer] account to schedule an appointment or provide information for your chart.     We look forward to seeing you and working with you on your health care needs.     Sincerely,   Virgil Ledbetter MD/EC CMA          *If you have already scheduled an appointment, please disregard this reminder

## 2019-01-08 NOTE — TELEPHONE ENCOUNTER
Panel Management Review      Patient has the following on his problem list:     Hypertension   Last three blood pressure readings:  BP Readings from Last 3 Encounters:   10/10/18 (!) 160/105   01/02/18 (!) 170/104   09/01/17 138/90     Blood pressure: FAILED    HTN Guidelines:  Age 18-59 BP range:  Less than 140/90  Age 60-85 with Diabetes:  Less than 140/90  Age 60-85 without Diabetes:  less than 150/90      Composite cancer screening  Chart review shows that this patient is due/due soon for the following None  Summary:    Patient is due/failing the following:   BP CHECK    Action needed:   Patient needs office visit for recheck on blood pressure.    Type of outreach:    Sent letter.    Questions for provider review:    None                                                                                                                                    Kevan,ZEFERINO       Chart routed to none .

## 2019-02-07 ENCOUNTER — TELEPHONE (OUTPATIENT)
Dept: LAB | Facility: CLINIC | Age: 69
End: 2019-02-07

## 2019-02-07 NOTE — TELEPHONE ENCOUNTER
Labs on this patient have . A letter was sent on 2019 and the patient has not made a lab appoinment.     Thank you,   Jen Weathers MLT ([facility])

## 2019-04-25 ENCOUNTER — TELEPHONE (OUTPATIENT)
Dept: FAMILY MEDICINE | Facility: CLINIC | Age: 69
End: 2019-04-25

## 2019-04-25 NOTE — TELEPHONE ENCOUNTER
Panel Management Review      Patient has the following on his problem list:   Hypertension   Last three blood pressure readings:  BP Readings from Last 3 Encounters:   10/10/18 (!) 160/105   01/02/18 (!) 170/104   09/01/17 138/90     Blood pressure: FAILED    HTN Guidelines:  Less than 140/90      Composite cancer screening  Chart review shows that this patient is due/due soon for the following None  Summary:    Patient is due/failing the following:   BP CHECK    Action needed:   Blood pressure      Type of outreach:    Sent letter.    Questions for provider review:    None                                                                                                                                    Leah Becerra M.A.       Chart routed to n/a .

## 2019-04-25 NOTE — LETTER
[facility]  [address] [facility] [address]  Phone: [phone]          Miko Gomez  [address] [facility] [address]          April 25, 2019          Israel Gomez      Our records indicate that you have not scheduled for a(n)Blood pressure check  which was recommended by your health care team. Monitoring and managing your preventative and chronic health conditions are very important to us.       If you have received your health care elsewhere, please provide us with that information so it can be documented in your chart.    Please call [phone] or message us through your [employer] account to schedule an appointment or provide information for your chart.     We look forward to seeing you and working with you on your health care needs.     Sincerely,   Virgil Ledbetter MD          *If you have already scheduled an appointment, please disregard this reminder

## 2019-05-13 DIAGNOSIS — I10 ESSENTIAL HYPERTENSION WITH GOAL BLOOD PRESSURE LESS THAN 140/90: ICD-10-CM

## 2019-05-13 NOTE — LETTER
May 14, 2019    Miko Gomez  [address] [address] [place] [address]        Dear Miko,       We recently received a refill request for atenolol (TENORMIN) 100 MG tablet and lisinopril (PRINIVIL/ZESTRIL) 20 MG tablet.  We have refilled these for a one time 30 day supply only because you are due now for a:    Blood Pressure office visit      Please call at your earliest convenience so that there will not be a delay with your future refills.          Thank you,   Your [facility] Team/teresa  [phone]

## 2019-05-14 RX ORDER — LISINOPRIL 20 MG/1
TABLET ORAL
Qty: 30 TABLET | Refills: 0 | Status: SHIPPED | OUTPATIENT
Start: 2019-05-14 | End: 2019-07-24

## 2019-05-14 RX ORDER — ATENOLOL 100 MG/1
TABLET ORAL
Qty: 30 TABLET | Refills: 0 | Status: SHIPPED | OUTPATIENT
Start: 2019-05-14 | End: 2019-07-24

## 2019-05-14 NOTE — TELEPHONE ENCOUNTER
Patient due now for office visit for his blood pressure.  30 day refill given on his meds.  Please notify.  Ingris Puckett RN

## 2019-07-24 ENCOUNTER — TELEPHONE (OUTPATIENT)
Dept: FAMILY MEDICINE | Facility: CLINIC | Age: 69
End: 2019-07-24

## 2019-07-24 DIAGNOSIS — I10 ESSENTIAL HYPERTENSION WITH GOAL BLOOD PRESSURE LESS THAN 140/90: ICD-10-CM

## 2019-07-24 RX ORDER — LISINOPRIL 20 MG/1
20 TABLET ORAL DAILY
Qty: 30 TABLET | Refills: 0 | Status: SHIPPED | OUTPATIENT
Start: 2019-07-24 | End: 2019-08-13

## 2019-07-24 RX ORDER — ATENOLOL 100 MG/1
100 TABLET ORAL DAILY
Qty: 30 TABLET | Refills: 0 | Status: SHIPPED | OUTPATIENT
Start: 2019-07-24 | End: 2019-08-13

## 2019-07-24 NOTE — TELEPHONE ENCOUNTER
Patient is calling to request refill until can be seen for RX: atenolol (TENORMIN) 100 MG tablet, and lisinopril (PRINIVIL/ZESTRIL) 20 MG tablet. Thank you.

## 2019-07-24 NOTE — TELEPHONE ENCOUNTER
Patient has pending 8/13/19 appointment with Dr. Virgil Ledbetter with labwork prior.   Ingris Puckett RN

## 2019-07-29 ENCOUNTER — DOCUMENTATION ONLY (OUTPATIENT)
Dept: LAB | Facility: CLINIC | Age: 69
End: 2019-07-29

## 2019-07-29 DIAGNOSIS — I10 HYPERTENSION GOAL BP (BLOOD PRESSURE) < 140/90: ICD-10-CM

## 2019-07-29 DIAGNOSIS — I10 HYPERTENSION GOAL BP (BLOOD PRESSURE) < 140/90: Primary | ICD-10-CM

## 2019-07-29 LAB
ANION GAP SERPL CALCULATED.3IONS-SCNC: 8 MMOL/L (ref 3–14)
BUN SERPL-MCNC: 13 MG/DL (ref 7–30)
CALCIUM SERPL-MCNC: 9 MG/DL (ref 8.5–10.1)
CHLORIDE SERPL-SCNC: 105 MMOL/L (ref 94–109)
CHOLEST SERPL-MCNC: 216 MG/DL
CO2 SERPL-SCNC: 24 MMOL/L (ref 20–32)
CREAT SERPL-MCNC: 0.78 MG/DL (ref 0.66–1.25)
GFR SERPL CREATININE-BSD FRML MDRD: >90 ML/MIN/{1.73_M2}
GLUCOSE SERPL-MCNC: 100 MG/DL (ref 70–99)
HDLC SERPL-MCNC: 78 MG/DL
LDLC SERPL CALC-MCNC: 136 MG/DL
NONHDLC SERPL-MCNC: 153 MG/DL
POTASSIUM SERPL-SCNC: 3.9 MMOL/L (ref 3.4–5.3)
SODIUM SERPL-SCNC: 137 MMOL/L (ref 133–144)
TRIGL SERPL-MCNC: 86 MG/DL

## 2019-07-29 PROCEDURE — 36415 COLL VENOUS BLD VENIPUNCTURE: CPT | Performed by: FAMILY MEDICINE

## 2019-07-29 PROCEDURE — 80048 BASIC METABOLIC PNL TOTAL CA: CPT | Performed by: FAMILY MEDICINE

## 2019-07-29 PROCEDURE — 80061 LIPID PANEL: CPT | Performed by: FAMILY MEDICINE

## 2019-07-29 NOTE — PROGRESS NOTES
...Your patient was in for lab test today and there are pending orders in Epic. I drew JIC tubes.  Please review and tag any additional orders to the lab appointment or enter orders as a future and I will watch for them.  Thank you   Amber   @ [facility]

## 2019-08-13 ENCOUNTER — OFFICE VISIT (OUTPATIENT)
Dept: FAMILY MEDICINE | Facility: CLINIC | Age: 69
End: 2019-08-13
Payer: COMMERCIAL

## 2019-08-13 VITALS
DIASTOLIC BLOOD PRESSURE: 111 MMHG | SYSTOLIC BLOOD PRESSURE: 174 MMHG | TEMPERATURE: 98.3 F | OXYGEN SATURATION: 99 % | BODY MASS INDEX: 24.83 KG/M2 | HEART RATE: 59 BPM | RESPIRATION RATE: 16 BRPM | WEIGHT: 149 LBS | HEIGHT: 65 IN

## 2019-08-13 DIAGNOSIS — I10 ESSENTIAL HYPERTENSION WITH GOAL BLOOD PRESSURE LESS THAN 140/90: ICD-10-CM

## 2019-08-13 PROCEDURE — 99214 OFFICE O/P EST MOD 30 MIN: CPT | Performed by: FAMILY MEDICINE

## 2019-08-13 RX ORDER — HYDROCHLOROTHIAZIDE 25 MG/1
25 TABLET ORAL DAILY
Qty: 30 TABLET | Refills: 1 | Status: SHIPPED | OUTPATIENT
Start: 2019-08-13 | End: 2019-08-27

## 2019-08-13 RX ORDER — ATENOLOL 100 MG/1
100 TABLET ORAL DAILY
Qty: 30 TABLET | Refills: 0 | Status: SHIPPED | OUTPATIENT
Start: 2019-08-13 | End: 2019-08-27

## 2019-08-13 RX ORDER — LISINOPRIL 20 MG/1
20 TABLET ORAL DAILY
Qty: 30 TABLET | Refills: 0 | Status: SHIPPED | OUTPATIENT
Start: 2019-08-13 | End: 2019-08-27

## 2019-08-13 ASSESSMENT — PAIN SCALES - GENERAL: PAINLEVEL: NO PAIN (0)

## 2019-08-13 ASSESSMENT — MIFFLIN-ST. JEOR: SCORE: 1377.74

## 2019-08-13 NOTE — PROGRESS NOTES
SUBJECTIVE:  68 year oldyear old male enters with  hypertension.  Pt. Has been compliant with medications and medications were reviewed.  No side effects. No chest pain or sob. Low sodium diet.    Current Outpatient Medications:      aspirin (SB LOW DOSE ASA EC) 81 MG EC tablet, Take 81 mg by mouth daily., Disp: , Rfl:      atenolol (TENORMIN) 100 MG tablet, Take 1 tablet (100 mg) by mouth daily, Disp: 30 tablet, Rfl: 0     EPINEPHrine (EPIPEN/ADRENACLICK/OR ANY BX GENERIC EQUIV) 0.3 MG/0.3ML injection 2-pack, Inject 0.3 mLs (0.3 mg) into the muscle once as needed, Disp: 0.3 mL, Rfl: 1     lisinopril (PRINIVIL/ZESTRIL) 20 MG tablet, Take 1 tablet (20 mg) by mouth daily, Disp: 30 tablet, Rfl: 0     Multiple Vitamins-Minerals (CENTRUM SILVER) CHEW, Take  by mouth., Disp: , Rfl:   Past Medical History:   Diagnosis Date     HTN      Orders Only on 07/29/2019   Component Date Value Ref Range Status     Cholesterol 07/29/2019 216* <200 mg/dL Final    Desirable:       <200 mg/dl     Triglycerides 07/29/2019 86  <150 mg/dL Final    Fasting specimen     HDL Cholesterol 07/29/2019 78  >39 mg/dL Final     LDL Cholesterol Calculated 07/29/2019 136* <100 mg/dL Final    Comment: Above desirable:  100-129 mg/dl  Borderline High:  130-159 mg/dL  High:             160-189 mg/dL  Very high:       >189 mg/dl       Non HDL Cholesterol 07/29/2019 153* <130 mg/dL Final    Comment: Above Desirable:  130-159 mg/dl  Borderline high:  160-189 mg/dl  High:             190-219 mg/dl  Very high:       >219 mg/dl       Sodium 07/29/2019 137  133 - 144 mmol/L Final     Potassium 07/29/2019 3.9  3.4 - 5.3 mmol/L Final     Chloride 07/29/2019 105  94 - 109 mmol/L Final     Carbon Dioxide 07/29/2019 24  20 - 32 mmol/L Final     Anion Gap 07/29/2019 8  3 - 14 mmol/L Final     Glucose 07/29/2019 100* 70 - 99 mg/dL Final    Fasting specimen     Urea Nitrogen 07/29/2019 13  7 - 30 mg/dL Final     Creatinine 07/29/2019 0.78  0.66 - 1.25 mg/dL Final      "GFR Estimate 07/29/2019 >90  >60 mL/min/[1.73_m2] Final    Comment: Non  GFR Calc  Starting 12/18/2018, serum creatinine based estimated GFR (eGFR) will be   calculated using the Chronic Kidney Disease Epidemiology Collaboration   (CKD-EPI) equation.       GFR Estimate If Black 07/29/2019 >90  >60 mL/min/[1.73_m2] Final    Comment:  GFR Calc  Starting 12/18/2018, serum creatinine based estimated GFR (eGFR) will be   calculated using the Chronic Kidney Disease Epidemiology Collaboration   (CKD-EPI) equation.       Calcium 07/29/2019 9.0  8.5 - 10.1 mg/dL Final      Reviewed health maintenance  Patient Active Problem List   Diagnosis     CARDIOVASCULAR SCREENING; LDL GOAL LESS THAN 130     Hypertension goal BP (blood pressure) < 140/90     Advanced directives, counseling/discussion         OBJECTIVE:  no apparent distress  BP (!) 174/111   Pulse 59   Temp 98.3  F (36.8  C) (Oral)   Resp 16   Ht 1.651 m (5' 5\")   Wt 67.6 kg (149 lb)   SpO2 99%   BMI 24.79 kg/m       Head: Normocephalic. No masses, lesions, tenderness or abnormalities.  Neck::Neck supple. No adenopathy. Thyroid symmetric, normal size.    Cardiovascular: negative. No lifts, heaves, or thrills. RRR. No murmurs, clicks gallops or rubs  Respiratory. Good diaphragmatic excursion. Lungs clear  Gastrointestinal:Abdomen soft, non-tender.  No masses, organomegaly    Orders Only on 07/29/2019   Component Date Value Ref Range Status     Cholesterol 07/29/2019 216* <200 mg/dL Final    Desirable:       <200 mg/dl     Triglycerides 07/29/2019 86  <150 mg/dL Final    Fasting specimen     HDL Cholesterol 07/29/2019 78  >39 mg/dL Final     LDL Cholesterol Calculated 07/29/2019 136* <100 mg/dL Final    Comment: Above desirable:  100-129 mg/dl  Borderline High:  130-159 mg/dL  High:             160-189 mg/dL  Very high:       >189 mg/dl       Non HDL Cholesterol 07/29/2019 153* <130 mg/dL Final    Comment: Above Desirable:  130-159 " mg/dl  Borderline high:  160-189 mg/dl  High:             190-219 mg/dl  Very high:       >219 mg/dl       Sodium 07/29/2019 137  133 - 144 mmol/L Final     Potassium 07/29/2019 3.9  3.4 - 5.3 mmol/L Final     Chloride 07/29/2019 105  94 - 109 mmol/L Final     Carbon Dioxide 07/29/2019 24  20 - 32 mmol/L Final     Anion Gap 07/29/2019 8  3 - 14 mmol/L Final     Glucose 07/29/2019 100* 70 - 99 mg/dL Final    Fasting specimen     Urea Nitrogen 07/29/2019 13  7 - 30 mg/dL Final     Creatinine 07/29/2019 0.78  0.66 - 1.25 mg/dL Final     GFR Estimate 07/29/2019 >90  >60 mL/min/[1.73_m2] Final    Comment: Non  GFR Calc  Starting 12/18/2018, serum creatinine based estimated GFR (eGFR) will be   calculated using the Chronic Kidney Disease Epidemiology Collaboration   (CKD-EPI) equation.       GFR Estimate If Black 07/29/2019 >90  >60 mL/min/[1.73_m2] Final    Comment:  GFR Calc  Starting 12/18/2018, serum creatinine based estimated GFR (eGFR) will be   calculated using the Chronic Kidney Disease Epidemiology Collaboration   (CKD-EPI) equation.       Calcium 07/29/2019 9.0  8.5 - 10.1 mg/dL Final         ICD-10-CM    1. Essential hypertension with goal blood pressure less than 140/90 I10 atenolol (TENORMIN) 100 MG tablet     lisinopril (PRINIVIL/ZESTRIL) 20 MG tablet    PLAN: Re check 2 weeks. Uncontrolled bp

## 2019-08-13 NOTE — NURSING NOTE
"Chief Complaint   Patient presents with     Hypertension       Initial BP (!) 174/111   Pulse 59   Temp 98.3  F (36.8  C) (Oral)   Resp 16   Ht 1.651 m (5' 5\")   Wt 67.6 kg (149 lb)   SpO2 99%   BMI 24.79 kg/m   Estimated body mass index is 24.79 kg/m  as calculated from the following:    Height as of this encounter: 1.651 m (5' 5\").    Weight as of this encounter: 67.6 kg (149 lb).  Medication Reconciliation: complete  Leah Becerra M.A.    "

## 2019-08-27 ENCOUNTER — OFFICE VISIT (OUTPATIENT)
Dept: FAMILY MEDICINE | Facility: CLINIC | Age: 69
End: 2019-08-27
Payer: COMMERCIAL

## 2019-08-27 VITALS
BODY MASS INDEX: 24.66 KG/M2 | SYSTOLIC BLOOD PRESSURE: 148 MMHG | HEART RATE: 59 BPM | DIASTOLIC BLOOD PRESSURE: 88 MMHG | RESPIRATION RATE: 18 BRPM | TEMPERATURE: 98.1 F | HEIGHT: 65 IN | WEIGHT: 148 LBS | OXYGEN SATURATION: 98 %

## 2019-08-27 DIAGNOSIS — E78.00 HIGH CHOLESTEROL: Primary | ICD-10-CM

## 2019-08-27 DIAGNOSIS — I10 ESSENTIAL HYPERTENSION WITH GOAL BLOOD PRESSURE LESS THAN 140/90: ICD-10-CM

## 2019-08-27 PROCEDURE — 99214 OFFICE O/P EST MOD 30 MIN: CPT | Performed by: FAMILY MEDICINE

## 2019-08-27 RX ORDER — ATORVASTATIN CALCIUM 20 MG/1
20 TABLET, FILM COATED ORAL DAILY
Qty: 90 TABLET | Refills: 0 | Status: SHIPPED | OUTPATIENT
Start: 2019-08-27 | End: 2019-10-30

## 2019-08-27 RX ORDER — ATENOLOL 100 MG/1
100 TABLET ORAL DAILY
Qty: 90 TABLET | Refills: 1 | Status: SHIPPED | OUTPATIENT
Start: 2019-08-27 | End: 2019-10-30

## 2019-08-27 RX ORDER — LISINOPRIL 20 MG/1
20 TABLET ORAL DAILY
Qty: 90 TABLET | Refills: 1 | Status: SHIPPED | OUTPATIENT
Start: 2019-08-27 | End: 2019-10-30

## 2019-08-27 ASSESSMENT — MIFFLIN-ST. JEOR: SCORE: 1373.2

## 2019-08-27 ASSESSMENT — PAIN SCALES - GENERAL: PAINLEVEL: NO PAIN (0)

## 2019-08-27 NOTE — PROGRESS NOTES
SUBJECTIVE:  68 year oldyear old male enters with  hypertension.  Pt. Has been compliant with medications and medications were reviewed.  No side effects. No chest pain or sob. Low sodium diet.    Current Outpatient Medications:      aspirin (SB LOW DOSE ASA EC) 81 MG EC tablet, Take 81 mg by mouth daily., Disp: , Rfl:      atenolol (TENORMIN) 100 MG tablet, Take 1 tablet (100 mg) by mouth daily, Disp: 30 tablet, Rfl: 0     EPINEPHrine (EPIPEN/ADRENACLICK/OR ANY BX GENERIC EQUIV) 0.3 MG/0.3ML injection 2-pack, Inject 0.3 mLs (0.3 mg) into the muscle once as needed, Disp: 0.3 mL, Rfl: 1     lisinopril (PRINIVIL/ZESTRIL) 20 MG tablet, Take 1 tablet (20 mg) by mouth daily, Disp: 30 tablet, Rfl: 0     Multiple Vitamins-Minerals (CENTRUM SILVER) CHEW, Take  by mouth., Disp: , Rfl:   Past Medical History:   Diagnosis Date     HTN      Orders Only on 07/29/2019   Component Date Value Ref Range Status     Cholesterol 07/29/2019 216* <200 mg/dL Final    Desirable:       <200 mg/dl     Triglycerides 07/29/2019 86  <150 mg/dL Final    Fasting specimen     HDL Cholesterol 07/29/2019 78  >39 mg/dL Final     LDL Cholesterol Calculated 07/29/2019 136* <100 mg/dL Final    Comment: Above desirable:  100-129 mg/dl  Borderline High:  130-159 mg/dL  High:             160-189 mg/dL  Very high:       >189 mg/dl       Non HDL Cholesterol 07/29/2019 153* <130 mg/dL Final    Comment: Above Desirable:  130-159 mg/dl  Borderline high:  160-189 mg/dl  High:             190-219 mg/dl  Very high:       >219 mg/dl       Sodium 07/29/2019 137  133 - 144 mmol/L Final     Potassium 07/29/2019 3.9  3.4 - 5.3 mmol/L Final     Chloride 07/29/2019 105  94 - 109 mmol/L Final     Carbon Dioxide 07/29/2019 24  20 - 32 mmol/L Final     Anion Gap 07/29/2019 8  3 - 14 mmol/L Final     Glucose 07/29/2019 100* 70 - 99 mg/dL Final    Fasting specimen     Urea Nitrogen 07/29/2019 13  7 - 30 mg/dL Final     Creatinine 07/29/2019 0.78  0.66 - 1.25 mg/dL Final  "    GFR Estimate 07/29/2019 >90  >60 mL/min/[1.73_m2] Final    Comment: Non  GFR Calc  Starting 12/18/2018, serum creatinine based estimated GFR (eGFR) will be   calculated using the Chronic Kidney Disease Epidemiology Collaboration   (CKD-EPI) equation.       GFR Estimate If Black 07/29/2019 >90  >60 mL/min/[1.73_m2] Final    Comment:  GFR Calc  Starting 12/18/2018, serum creatinine based estimated GFR (eGFR) will be   calculated using the Chronic Kidney Disease Epidemiology Collaboration   (CKD-EPI) equation.       Calcium 07/29/2019 9.0  8.5 - 10.1 mg/dL Final      Reviewed health maintenance  Patient Active Problem List   Diagnosis     CARDIOVASCULAR SCREENING; LDL GOAL LESS THAN 130     Hypertension goal BP (blood pressure) < 140/90     Advanced directives, counseling/discussion         OBJECTIVE:  no apparent distress  BP (!) 148/88   Pulse 59   Temp 98.1  F (36.7  C) (Oral)   Resp 18   Ht 1.651 m (5' 5\")   Wt 67.1 kg (148 lb)   SpO2 98%   BMI 24.63 kg/m       Head: Normocephalic. No masses, lesions, tenderness or abnormalities.  Neck::Neck supple. No adenopathy. Thyroid symmetric, normal size.    Cardiovascular: negative. No lifts, heaves, or thrills. RRR. No murmurs, clicks gallops or rubs  Respiratory. Good diaphragmatic excursion. Lungs clear  Gastrointestinal:Abdomen soft, non-tender.  No masses, organomegaly    Orders Only on 07/29/2019   Component Date Value Ref Range Status     Cholesterol 07/29/2019 216* <200 mg/dL Final    Desirable:       <200 mg/dl     Triglycerides 07/29/2019 86  <150 mg/dL Final    Fasting specimen     HDL Cholesterol 07/29/2019 78  >39 mg/dL Final     LDL Cholesterol Calculated 07/29/2019 136* <100 mg/dL Final    Comment: Above desirable:  100-129 mg/dl  Borderline High:  130-159 mg/dL  High:             160-189 mg/dL  Very high:       >189 mg/dl       Non HDL Cholesterol 07/29/2019 153* <130 mg/dL Final    Comment: Above Desirable:  " 130-159 mg/dl  Borderline high:  160-189 mg/dl  High:             190-219 mg/dl  Very high:       >219 mg/dl       Sodium 07/29/2019 137  133 - 144 mmol/L Final     Potassium 07/29/2019 3.9  3.4 - 5.3 mmol/L Final     Chloride 07/29/2019 105  94 - 109 mmol/L Final     Carbon Dioxide 07/29/2019 24  20 - 32 mmol/L Final     Anion Gap 07/29/2019 8  3 - 14 mmol/L Final     Glucose 07/29/2019 100* 70 - 99 mg/dL Final    Fasting specimen     Urea Nitrogen 07/29/2019 13  7 - 30 mg/dL Final     Creatinine 07/29/2019 0.78  0.66 - 1.25 mg/dL Final     GFR Estimate 07/29/2019 >90  >60 mL/min/[1.73_m2] Final    Comment: Non  GFR Calc  Starting 12/18/2018, serum creatinine based estimated GFR (eGFR) will be   calculated using the Chronic Kidney Disease Epidemiology Collaboration   (CKD-EPI) equation.       GFR Estimate If Black 07/29/2019 >90  >60 mL/min/[1.73_m2] Final    Comment:  GFR Calc  Starting 12/18/2018, serum creatinine based estimated GFR (eGFR) will be   calculated using the Chronic Kidney Disease Epidemiology Collaboration   (CKD-EPI) equation.       Calcium 07/29/2019 9.0  8.5 - 10.1 mg/dL Final     The 10-year ASCVD risk score (Julia WAGNER Jr., et al., 2013) is: 23.4%    Values used to calculate the score:      Age: 68 years      Sex: Male      Is Non- : No      Diabetic: No      Tobacco smoker: Yes      Systolic Blood Pressure: 148 mmHg      Is BP treated: Yes      HDL Cholesterol: 78 mg/dL      Total Cholesterol: 216 mg/dL      ICD-10-CM    1. Essential hypertension with goal blood pressure less than 140/90 I10 lisinopril (PRINIVIL/ZESTRIL) 20 MG tablet     atenolol (TENORMIN) 100 MG tablet    PLAN: Follow up in 3 months

## 2019-08-27 NOTE — NURSING NOTE
"Chief Complaint   Patient presents with     Hypertension       Initial BP (!) 163/96   Pulse 59   Temp 98.1  F (36.7  C) (Oral)   Resp 18   Ht 1.651 m (5' 5\")   Wt 67.1 kg (148 lb)   SpO2 98%   BMI 24.63 kg/m   Estimated body mass index is 24.63 kg/m  as calculated from the following:    Height as of this encounter: 1.651 m (5' 5\").    Weight as of this encounter: 67.1 kg (148 lb).  Medication Reconciliation: complete  Leah Becerra M.A.    "

## 2019-10-01 ENCOUNTER — HEALTH MAINTENANCE LETTER (OUTPATIENT)
Age: 69
End: 2019-10-01

## 2019-10-24 DIAGNOSIS — E78.00 HIGH CHOLESTEROL: ICD-10-CM

## 2019-10-24 DIAGNOSIS — I10 ESSENTIAL HYPERTENSION WITH GOAL BLOOD PRESSURE LESS THAN 140/90: ICD-10-CM

## 2019-10-24 LAB
ALBUMIN SERPL-MCNC: 3.8 G/DL (ref 3.4–5)
ALP SERPL-CCNC: 53 U/L (ref 40–150)
ALT SERPL W P-5'-P-CCNC: 51 U/L (ref 0–70)
ANION GAP SERPL CALCULATED.3IONS-SCNC: 4 MMOL/L (ref 3–14)
AST SERPL W P-5'-P-CCNC: 21 U/L (ref 0–45)
BILIRUB SERPL-MCNC: 0.9 MG/DL (ref 0.2–1.3)
BUN SERPL-MCNC: 12 MG/DL (ref 7–30)
CALCIUM SERPL-MCNC: 8.9 MG/DL (ref 8.5–10.1)
CHLORIDE SERPL-SCNC: 109 MMOL/L (ref 94–109)
CHOLEST SERPL-MCNC: 157 MG/DL
CO2 SERPL-SCNC: 29 MMOL/L (ref 20–32)
CREAT SERPL-MCNC: 0.82 MG/DL (ref 0.66–1.25)
GFR SERPL CREATININE-BSD FRML MDRD: >90 ML/MIN/{1.73_M2}
GLUCOSE SERPL-MCNC: 87 MG/DL (ref 70–99)
HDLC SERPL-MCNC: 60 MG/DL
LDLC SERPL CALC-MCNC: 58 MG/DL
NONHDLC SERPL-MCNC: 97 MG/DL
POTASSIUM SERPL-SCNC: 4.3 MMOL/L (ref 3.4–5.3)
PROT SERPL-MCNC: 7.6 G/DL (ref 6.8–8.8)
SODIUM SERPL-SCNC: 142 MMOL/L (ref 133–144)
TRIGL SERPL-MCNC: 197 MG/DL

## 2019-10-24 PROCEDURE — 36415 COLL VENOUS BLD VENIPUNCTURE: CPT | Performed by: FAMILY MEDICINE

## 2019-10-24 PROCEDURE — 80061 LIPID PANEL: CPT | Performed by: FAMILY MEDICINE

## 2019-10-24 PROCEDURE — 80053 COMPREHEN METABOLIC PANEL: CPT | Performed by: FAMILY MEDICINE

## 2019-10-30 ENCOUNTER — OFFICE VISIT (OUTPATIENT)
Dept: FAMILY MEDICINE | Facility: CLINIC | Age: 69
End: 2019-10-30
Payer: COMMERCIAL

## 2019-10-30 VITALS
HEART RATE: 56 BPM | DIASTOLIC BLOOD PRESSURE: 88 MMHG | SYSTOLIC BLOOD PRESSURE: 139 MMHG | WEIGHT: 150 LBS | TEMPERATURE: 98.1 F | BODY MASS INDEX: 24.99 KG/M2 | HEIGHT: 65 IN

## 2019-10-30 DIAGNOSIS — E78.00 HIGH CHOLESTEROL: ICD-10-CM

## 2019-10-30 DIAGNOSIS — I10 ESSENTIAL HYPERTENSION WITH GOAL BLOOD PRESSURE LESS THAN 140/90: ICD-10-CM

## 2019-10-30 DIAGNOSIS — Z23 NEED FOR PROPHYLACTIC VACCINATION AND INOCULATION AGAINST INFLUENZA: Primary | ICD-10-CM

## 2019-10-30 PROCEDURE — 99214 OFFICE O/P EST MOD 30 MIN: CPT | Mod: 25 | Performed by: FAMILY MEDICINE

## 2019-10-30 PROCEDURE — 90662 IIV NO PRSV INCREASED AG IM: CPT | Performed by: FAMILY MEDICINE

## 2019-10-30 PROCEDURE — 90471 IMMUNIZATION ADMIN: CPT | Performed by: FAMILY MEDICINE

## 2019-10-30 RX ORDER — ATORVASTATIN CALCIUM 20 MG/1
20 TABLET, FILM COATED ORAL DAILY
Qty: 90 TABLET | Refills: 1 | Status: SHIPPED | OUTPATIENT
Start: 2019-10-30 | End: 2020-09-03

## 2019-10-30 RX ORDER — ATENOLOL 100 MG/1
100 TABLET ORAL DAILY
Qty: 90 TABLET | Refills: 1 | Status: SHIPPED | OUTPATIENT
Start: 2019-10-30 | End: 2020-09-03

## 2019-10-30 RX ORDER — LISINOPRIL 20 MG/1
20 TABLET ORAL DAILY
Qty: 90 TABLET | Refills: 1 | Status: SHIPPED | OUTPATIENT
Start: 2019-10-30 | End: 2020-09-03

## 2019-10-30 ASSESSMENT — MIFFLIN-ST. JEOR: SCORE: 1382.28

## 2019-10-30 NOTE — NURSING NOTE
"Chief Complaint   Patient presents with     Hypertension     Lipids       Initial BP (!) 142/92   Pulse 56   Temp 98.1  F (36.7  C) (Oral)   Ht 1.651 m (5' 5\")   Wt 68 kg (150 lb)   BMI 24.96 kg/m   Estimated body mass index is 24.96 kg/m  as calculated from the following:    Height as of this encounter: 1.651 m (5' 5\").    Weight as of this encounter: 68 kg (150 lb).    Marisa Patton, ZEFERINO    "

## 2019-10-30 NOTE — PROGRESS NOTES
SUBJECTIVE:  68 year oldyear old male enters with  hypertension.  Pt. Has been compliant with medications and medications were reviewed.  No side effects. No chest pain or sob. Low sodium diet.    Current Outpatient Medications:      aspirin (SB LOW DOSE ASA EC) 81 MG EC tablet, Take 81 mg by mouth daily., Disp: , Rfl:      atenolol (TENORMIN) 100 MG tablet, Take 1 tablet (100 mg) by mouth daily, Disp: 90 tablet, Rfl: 1     atorvastatin (LIPITOR) 20 MG tablet, Take 1 tablet (20 mg) by mouth daily, Disp: 90 tablet, Rfl: 0     EPINEPHrine (EPIPEN/ADRENACLICK/OR ANY BX GENERIC EQUIV) 0.3 MG/0.3ML injection 2-pack, Inject 0.3 mLs (0.3 mg) into the muscle once as needed, Disp: 0.3 mL, Rfl: 1     lisinopril (PRINIVIL/ZESTRIL) 20 MG tablet, Take 1 tablet (20 mg) by mouth daily, Disp: 90 tablet, Rfl: 1     Multiple Vitamins-Minerals (CENTRUM SILVER) CHEW, Take  by mouth., Disp: , Rfl:   Past Medical History:   Diagnosis Date     HTN      Orders Only on 10/24/2019   Component Date Value Ref Range Status     Cholesterol 10/24/2019 157  <200 mg/dL Final     Triglycerides 10/24/2019 197* <150 mg/dL Final    Comment: Borderline high:  150-199 mg/dl  High:             200-499 mg/dl  Very high:       >499 mg/dl  Fasting specimen       HDL Cholesterol 10/24/2019 60  >39 mg/dL Final     LDL Cholesterol Calculated 10/24/2019 58  <100 mg/dL Final    Desirable:       <100 mg/dl     Non HDL Cholesterol 10/24/2019 97  <130 mg/dL Final     Sodium 10/24/2019 142  133 - 144 mmol/L Final     Potassium 10/24/2019 4.3  3.4 - 5.3 mmol/L Final     Chloride 10/24/2019 109  94 - 109 mmol/L Final     Carbon Dioxide 10/24/2019 29  20 - 32 mmol/L Final     Anion Gap 10/24/2019 4  3 - 14 mmol/L Final     Glucose 10/24/2019 87  70 - 99 mg/dL Final    Fasting specimen     Urea Nitrogen 10/24/2019 12  7 - 30 mg/dL Final     Creatinine 10/24/2019 0.82  0.66 - 1.25 mg/dL Final     GFR Estimate 10/24/2019 >90  >60 mL/min/[1.73_m2] Final    Comment: Non  " GFR Calc  Starting 12/18/2018, serum creatinine based estimated GFR (eGFR) will be   calculated using the Chronic Kidney Disease Epidemiology Collaboration   (CKD-EPI) equation.       GFR Estimate If Black 10/24/2019 >90  >60 mL/min/[1.73_m2] Final    Comment:  GFR Calc  Starting 12/18/2018, serum creatinine based estimated GFR (eGFR) will be   calculated using the Chronic Kidney Disease Epidemiology Collaboration   (CKD-EPI) equation.       Calcium 10/24/2019 8.9  8.5 - 10.1 mg/dL Final     Bilirubin Total 10/24/2019 0.9  0.2 - 1.3 mg/dL Final     Albumin 10/24/2019 3.8  3.4 - 5.0 g/dL Final     Protein Total 10/24/2019 7.6  6.8 - 8.8 g/dL Final     Alkaline Phosphatase 10/24/2019 53  40 - 150 U/L Final     ALT 10/24/2019 51  0 - 70 U/L Final     AST 10/24/2019 21  0 - 45 U/L Final      Reviewed health maintenance  Patient Active Problem List   Diagnosis     CARDIOVASCULAR SCREENING; LDL GOAL LESS THAN 130     Hypertension goal BP (blood pressure) < 140/90     Advanced directives, counseling/discussion         OBJECTIVE:  no apparent distress  /88   Pulse 56   Temp 98.1  F (36.7  C) (Oral)   Ht 1.651 m (5' 5\")   Wt 68 kg (150 lb)   BMI 24.96 kg/m       Head: Normocephalic. No masses, lesions, tenderness or abnormalities.  Neck::Neck supple. No adenopathy. Thyroid symmetric, normal size.    Cardiovascular: negative. No lifts, heaves, or thrills. RRR. No murmurs, clicks gallops or rubs  Respiratory. Good diaphragmatic excursion. Lungs clear  Gastrointestinal:Abdomen soft, non-tender.  No masses, organomegaly    Orders Only on 10/24/2019   Component Date Value Ref Range Status     Cholesterol 10/24/2019 157  <200 mg/dL Final     Triglycerides 10/24/2019 197* <150 mg/dL Final    Comment: Borderline high:  150-199 mg/dl  High:             200-499 mg/dl  Very high:       >499 mg/dl  Fasting specimen       HDL Cholesterol 10/24/2019 60  >39 mg/dL Final     LDL Cholesterol " Calculated 10/24/2019 58  <100 mg/dL Final    Desirable:       <100 mg/dl     Non HDL Cholesterol 10/24/2019 97  <130 mg/dL Final     Sodium 10/24/2019 142  133 - 144 mmol/L Final     Potassium 10/24/2019 4.3  3.4 - 5.3 mmol/L Final     Chloride 10/24/2019 109  94 - 109 mmol/L Final     Carbon Dioxide 10/24/2019 29  20 - 32 mmol/L Final     Anion Gap 10/24/2019 4  3 - 14 mmol/L Final     Glucose 10/24/2019 87  70 - 99 mg/dL Final    Fasting specimen     Urea Nitrogen 10/24/2019 12  7 - 30 mg/dL Final     Creatinine 10/24/2019 0.82  0.66 - 1.25 mg/dL Final     GFR Estimate 10/24/2019 >90  >60 mL/min/[1.73_m2] Final    Comment: Non  GFR Calc  Starting 12/18/2018, serum creatinine based estimated GFR (eGFR) will be   calculated using the Chronic Kidney Disease Epidemiology Collaboration   (CKD-EPI) equation.       GFR Estimate If Black 10/24/2019 >90  >60 mL/min/[1.73_m2] Final    Comment:  GFR Calc  Starting 12/18/2018, serum creatinine based estimated GFR (eGFR) will be   calculated using the Chronic Kidney Disease Epidemiology Collaboration   (CKD-EPI) equation.       Calcium 10/24/2019 8.9  8.5 - 10.1 mg/dL Final     Bilirubin Total 10/24/2019 0.9  0.2 - 1.3 mg/dL Final     Albumin 10/24/2019 3.8  3.4 - 5.0 g/dL Final     Protein Total 10/24/2019 7.6  6.8 - 8.8 g/dL Final     Alkaline Phosphatase 10/24/2019 53  40 - 150 U/L Final     ALT 10/24/2019 51  0 - 70 U/L Final     AST 10/24/2019 21  0 - 45 U/L Final         ICD-10-CM    1. Need for prophylactic vaccination and inoculation against influenza Z23 INFLUENZA (HIGH DOSE) 3 VALENT VACCINE [56326]     ADMIN INFLUENZA (For MEDICARE Patients ONLY) []    PLAN: Follow up in 6 months     SUBJECTIVE:  68 year old enters for recheck of high cholesterol.  Pt. Has been taking med and has no side effects. Pt is following diet.  Denies chest pain and SOB.  Past Medical History:   Diagnosis Date     HTN      Past Surgical History:  "  Procedure Laterality Date     C ORAL SURGERY PROCEDURE       ENT SURGERY      tonsils       Current Outpatient Medications:      aspirin (SB LOW DOSE ASA EC) 81 MG EC tablet, Take 81 mg by mouth daily., Disp: , Rfl:      atenolol (TENORMIN) 100 MG tablet, Take 1 tablet (100 mg) by mouth daily, Disp: 90 tablet, Rfl: 1     atorvastatin (LIPITOR) 20 MG tablet, Take 1 tablet (20 mg) by mouth daily, Disp: 90 tablet, Rfl: 0     EPINEPHrine (EPIPEN/ADRENACLICK/OR ANY BX GENERIC EQUIV) 0.3 MG/0.3ML injection 2-pack, Inject 0.3 mLs (0.3 mg) into the muscle once as needed, Disp: 0.3 mL, Rfl: 1     lisinopril (PRINIVIL/ZESTRIL) 20 MG tablet, Take 1 tablet (20 mg) by mouth daily, Disp: 90 tablet, Rfl: 1     Multiple Vitamins-Minerals (CENTRUM SILVER) CHEW, Take  by mouth., Disp: , Rfl:   Reviewed health maintenance   Patient Active Problem List   Diagnosis     CARDIOVASCULAR SCREENING; LDL GOAL LESS THAN 130     Hypertension goal BP (blood pressure) < 140/90     Advanced directives, counseling/discussion       OBJECTIVE:  no apparent distress  /88   Pulse 56   Temp 98.1  F (36.7  C) (Oral)   Ht 1.651 m (5' 5\")   Wt 68 kg (150 lb)   BMI 24.96 kg/m        Exam:  Constitutional: healthy, alert and no distress  Head: Normocephalic. No masses, lesions, tenderness or abnormalities  Neck: Neck supple. No adenopathy. Thyroid symmetric, normal size,  Cardiovascular: negative, PMI normal. No lifts, heaves, or thrills. RRR. No murmurs, clicks gallops or rub  Respiratory: negative Lungs clear    Orders Only on 10/24/2019   Component Date Value Ref Range Status     Cholesterol 10/24/2019 157  <200 mg/dL Final     Triglycerides 10/24/2019 197* <150 mg/dL Final    Comment: Borderline high:  150-199 mg/dl  High:             200-499 mg/dl  Very high:       >499 mg/dl  Fasting specimen       HDL Cholesterol 10/24/2019 60  >39 mg/dL Final     LDL Cholesterol Calculated 10/24/2019 58  <100 mg/dL Final    Desirable:       <100 mg/dl "     Non HDL Cholesterol 10/24/2019 97  <130 mg/dL Final     Sodium 10/24/2019 142  133 - 144 mmol/L Final     Potassium 10/24/2019 4.3  3.4 - 5.3 mmol/L Final     Chloride 10/24/2019 109  94 - 109 mmol/L Final     Carbon Dioxide 10/24/2019 29  20 - 32 mmol/L Final     Anion Gap 10/24/2019 4  3 - 14 mmol/L Final     Glucose 10/24/2019 87  70 - 99 mg/dL Final    Fasting specimen     Urea Nitrogen 10/24/2019 12  7 - 30 mg/dL Final     Creatinine 10/24/2019 0.82  0.66 - 1.25 mg/dL Final     GFR Estimate 10/24/2019 >90  >60 mL/min/[1.73_m2] Final    Comment: Non  GFR Calc  Starting 12/18/2018, serum creatinine based estimated GFR (eGFR) will be   calculated using the Chronic Kidney Disease Epidemiology Collaboration   (CKD-EPI) equation.       GFR Estimate If Black 10/24/2019 >90  >60 mL/min/[1.73_m2] Final    Comment:  GFR Calc  Starting 12/18/2018, serum creatinine based estimated GFR (eGFR) will be   calculated using the Chronic Kidney Disease Epidemiology Collaboration   (CKD-EPI) equation.       Calcium 10/24/2019 8.9  8.5 - 10.1 mg/dL Final     Bilirubin Total 10/24/2019 0.9  0.2 - 1.3 mg/dL Final     Albumin 10/24/2019 3.8  3.4 - 5.0 g/dL Final     Protein Total 10/24/2019 7.6  6.8 - 8.8 g/dL Final     Alkaline Phosphatase 10/24/2019 53  40 - 150 U/L Final     ALT 10/24/2019 51  0 - 70 U/L Final     AST 10/24/2019 21  0 - 45 U/L Final           ICD-10-CM    1. Need for prophylactic vaccination and inoculation against influenza Z23 INFLUENZA (HIGH DOSE) 3 VALENT VACCINE [62593]     ADMIN INFLUENZA (For MEDICARE Patients ONLY) []    PLAN: Follow up in 1 year

## 2019-11-13 PROBLEM — E78.00 HIGH CHOLESTEROL: Status: ACTIVE | Noted: 2019-11-13

## 2019-12-15 ENCOUNTER — HEALTH MAINTENANCE LETTER (OUTPATIENT)
Age: 69
End: 2019-12-15

## 2019-12-20 ENCOUNTER — TELEPHONE (OUTPATIENT)
Dept: FAMILY MEDICINE | Facility: CLINIC | Age: 69
End: 2019-12-20

## 2019-12-20 NOTE — TELEPHONE ENCOUNTER
Patient received an alert on his Mychart that he was due for a physical.  He was calling to see if needed to take action on anything.  As far as the epi pen I informed him that  Dr. Ledbetter did not send that when he was seen and he is unsure if he needs it but he will call the pharmacy and ask for a refill if he finds he does.  As far as the alert, I informed him that he was good for now and not due to be seen until 4/30/2020 and at that time if he could make that appointment for a physical that would be great.  Patient verbalizes good understanding, agrees with plan and states she needs no further support. Leyla Arrington R.N.    Per OV note dated 10/30/19 from  Dr. Ledbetter is as follows:      Return in about 6 months (around 4/30/2020).

## 2019-12-20 NOTE — TELEPHONE ENCOUNTER
Reason for call:  Other   Patient called regarding (reason for call): call back  Additional comments: Pt is wondering if he needs to be seen for anything. Or any follow ups that need to be done.   And is also wondering if EPINEPHrine (EPIPEN/ADRENACLICK/OR ANY BX GENERIC EQUIV) 0.3 MG/0.3ML injection 2-pack needs to be renewed?  Phone number to reach patient:  Home number on file [phone] (home)  [email or web address]      Best Time:  any    Can we leave a detailed message on this number?  YES

## 2020-02-14 ENCOUNTER — NURSE TRIAGE (OUTPATIENT)
Dept: NURSING | Facility: CLINIC | Age: 70
End: 2020-02-14

## 2020-02-14 NOTE — TELEPHONE ENCOUNTER
"Miko calls and says that he has small, right groin pain. Pain began 14 days ago. Pt. Says that he thinks this pain is from his cough. Pt. Also says that he has pain on his upper, right thigh. Pt. Says that he will see his DrMarie On Thursday, with his appointment.    Reason for Disposition    [1] MODERATE pain (e.g., interferes with normal activities, limping) AND [2] present > 3 days    Additional Information    Negative: Looks like a broken bone or dislocated joint (e.g., crooked or deformed)    Negative: Sounds like a life-threatening emergency to the triager    Negative: Followed a leg injury    Negative: Leg swelling is main symptom    Negative: Back pain radiating (shooting) into leg(s)    Negative: Knee pain is main symptom    Negative: Ankle pain is main symptom    Negative: Pregnant    Negative: Chest pain    Negative: Difficulty breathing    Negative: Entire foot is cool or blue in comparison to other side    Negative: Unable to walk    Negative: [1] Red area or streak AND [2] fever    Negative: [1] Swollen joint AND [2] fever    Negative: [1] Cast on leg or ankle AND [2] now increased pain    Negative: Patient sounds very sick or weak to the triager    Negative: [1] SEVERE pain (e.g., excruciating, unable to do any normal activities) AND [2] not improved after 2 hours of pain medicine    Negative: [1] Thigh or calf pain AND [2] only 1 side AND [3] present > 1 hour    Negative: [1] Thigh, calf, or ankle swelling AND [2] only 1 side    Negative: [1] Thigh, calf, or ankle swelling AND [2] bilateral AND [3] 1 side is more swollen    Negative: [1] Red area or streak AND [2] large (> 2 in. or 5 cm)    Negative: History of prior \"blood clot\" in leg or lungs (i.e., deep vein thrombosis, pulmonary embolism)    Negative: History of inherited increased risk of blood clots (e.g., Factor 5 Leiden, Anti-thrombin 3, Protein C or Protein S deficiency, Prothrombin mutation)    Negative: Recent illness requiring prolonged " "bedrest (i.e., immobilization)    Negative: Hip or leg fracture in past 2 months (e.g, or had cast on leg or ankle)    Negative: Major surgery in the past two months    Negative: Cancer treatment in the past two months (or has cancer now)    Negative: Recent long-distance travel with prolonged time in car, bus, plane, or train (i.e., within past 2 weeks; 6 or  more hours duration)    Negative: [1] Painful rash AND [2] multiple small blisters grouped together (i.e., dermatomal distribution or \"band\" or \"stripe\")    Negative: Looks like a boil, infected sore, deep ulcer or other infected rash (spreading redness, pus)    Negative: [1] Localized rash is very painful AND [2] no fever    Negative: Numbness in a leg or foot (i.e., loss of sensation)    Negative: Localized pain, redness or hard lump along vein    Protocols used: LEG PAIN-A-AH      "

## 2020-02-20 ENCOUNTER — OFFICE VISIT (OUTPATIENT)
Dept: FAMILY MEDICINE | Facility: CLINIC | Age: 70
End: 2020-02-20
Payer: COMMERCIAL

## 2020-02-20 VITALS
BODY MASS INDEX: 26.33 KG/M2 | DIASTOLIC BLOOD PRESSURE: 84 MMHG | SYSTOLIC BLOOD PRESSURE: 135 MMHG | TEMPERATURE: 97.7 F | HEART RATE: 63 BPM | HEIGHT: 65 IN | WEIGHT: 158 LBS | RESPIRATION RATE: 18 BRPM

## 2020-02-20 DIAGNOSIS — I10 ESSENTIAL HYPERTENSION WITH GOAL BLOOD PRESSURE LESS THAN 140/90: ICD-10-CM

## 2020-02-20 DIAGNOSIS — R36.1 HEMATOSPERMIA: Primary | ICD-10-CM

## 2020-02-20 LAB
ALBUMIN UR-MCNC: NEGATIVE MG/DL
APPEARANCE UR: CLEAR
BILIRUB UR QL STRIP: NEGATIVE
COLOR UR AUTO: YELLOW
GLUCOSE UR STRIP-MCNC: NEGATIVE MG/DL
HGB UR QL STRIP: NEGATIVE
KETONES UR STRIP-MCNC: NEGATIVE MG/DL
LEUKOCYTE ESTERASE UR QL STRIP: NEGATIVE
MUCOUS THREADS #/AREA URNS LPF: PRESENT /LPF
NITRATE UR QL: NEGATIVE
PH UR STRIP: 6 PH (ref 5–7)
PSA SERPL-ACNC: 1.66 UG/L (ref 0–4)
RBC #/AREA URNS AUTO: ABNORMAL /HPF
SOURCE: ABNORMAL
SP GR UR STRIP: 1.02 (ref 1–1.03)
UROBILINOGEN UR STRIP-ACNC: 0.2 EU/DL (ref 0.2–1)
WBC #/AREA URNS AUTO: ABNORMAL /HPF

## 2020-02-20 PROCEDURE — 87491 CHLMYD TRACH DNA AMP PROBE: CPT | Performed by: FAMILY MEDICINE

## 2020-02-20 PROCEDURE — 81001 URINALYSIS AUTO W/SCOPE: CPT | Performed by: FAMILY MEDICINE

## 2020-02-20 PROCEDURE — G0103 PSA SCREENING: HCPCS | Performed by: FAMILY MEDICINE

## 2020-02-20 PROCEDURE — 99214 OFFICE O/P EST MOD 30 MIN: CPT | Performed by: FAMILY MEDICINE

## 2020-02-20 PROCEDURE — 36415 COLL VENOUS BLD VENIPUNCTURE: CPT | Performed by: FAMILY MEDICINE

## 2020-02-20 PROCEDURE — 87591 N.GONORRHOEAE DNA AMP PROB: CPT | Performed by: FAMILY MEDICINE

## 2020-02-20 ASSESSMENT — MIFFLIN-ST. JEOR: SCORE: 1413.56

## 2020-02-20 NOTE — PROGRESS NOTES
Subjective     Miko Gomez is a 69 year old male who presents to clinic today for the following health issues:    HPI     Patient is here for concern of blood in ejaculate started about 1 month ago,   Occurs every time he ejaculates .   He has  not been sexually active for several years . He does self induced ejaculation   No hx of  recent biopsy (within past six weeks) of the prostate gland  No recent treatment for prostate cancer, either external beam radiation or brachytherapy  No symptoms of urethritis such as dysuria or urethral discharge , No Lower urinary tract symptoms  Patient is currently taking low dose aspirin   No recent travel  Father  from unknown cancer  Brother  of head and neck cancer   Brother  of pancreas cancer   No hx of prostate cancer in the family   Patient Active Problem List   Diagnosis     CARDIOVASCULAR SCREENING; LDL GOAL LESS THAN 130     Hypertension goal BP (blood pressure) < 140/90     Advanced directives, counseling/discussion     High cholesterol     Past Surgical History:   Procedure Laterality Date     C ORAL SURGERY PROCEDURE       ENT SURGERY      tonsils       Social History     Tobacco Use     Smoking status: Current Every Day Smoker     Packs/day: 1.00     Years: 40.00     Pack years: 40.00     Types: Cigarettes     Smokeless tobacco: Never Used   Substance Use Topics     Alcohol use: Yes     Comment: CHERYL EVERYDAY     Family History   Problem Relation Age of Onset     Cancer Father      Cancer Maternal Grandmother      Cancer Brother         neck age 48     Cancer Other          Current Outpatient Medications   Medication Sig Dispense Refill     aspirin (SB LOW DOSE ASA EC) 81 MG EC tablet Take 81 mg by mouth daily.       atenolol (TENORMIN) 100 MG tablet Take 1 tablet (100 mg) by mouth daily 90 tablet 1     atorvastatin (LIPITOR) 20 MG tablet Take 1 tablet (20 mg) by mouth daily 90 tablet 1     EPINEPHrine (EPIPEN/ADRENACLICK/OR ANY BX GENERIC  "EQUIV) 0.3 MG/0.3ML injection 2-pack Inject 0.3 mLs (0.3 mg) into the muscle once as needed 0.3 mL 1     lisinopril (PRINIVIL/ZESTRIL) 20 MG tablet Take 1 tablet (20 mg) by mouth daily 90 tablet 1     Multiple Vitamins-Minerals (CENTRUM SILVER) CHEW Take  by mouth.       Allergies   Allergen Reactions     Bee Venom Anaphylaxis     Hctz [Hydrochlorothiazide] Muscle Pain (Myalgia)     Recent Labs   Lab Test 10/24/19  0846 07/29/19  0912  10/25/16  0839  12/11/14  1243  08/28/13  0742 08/09/13  0937   LDL 58 136*  --  132*   < >  --    < >  --  107   HDL 60 78  --  55   < >  --    < >  --  71   TRIG 197* 86  --  114   < >  --    < >  --  55   ALT 51  --   --   --   --  26  --   --  22   CR 0.82 0.78   < > 0.92   < > 0.83   < >  --  0.78   GFRESTIMATED >90 >90   < > 83   < > >90  Non  GFR Calc     < >  --  >90   GFRESTBLACK >90 >90   < > >90  African American GFR Calc     < > >90   GFR Calc     < >  --  >90   POTASSIUM 4.3 3.9   < > 4.2   < > 4.3   < >  --  4.2   TSH  --   --   --   --   --   --   --  3.52  --     < > = values in this interval not displayed.      BP Readings from Last 3 Encounters:   02/20/20 135/84   10/30/19 139/88   08/27/19 (!) 148/88    Wt Readings from Last 3 Encounters:   02/20/20 71.7 kg (158 lb)   10/30/19 68 kg (150 lb)   08/27/19 67.1 kg (148 lb)                    Reviewed and updated as needed this visit by Provider  Tobacco  Allergies  Meds  Problems  Med Hx  Surg Hx  Fam Hx         Review of Systems   ROS COMP: Constitutional, HEENT, cardiovascular, pulmonary, gi and gu systems are negative, except as otherwise noted.      Objective    /84   Pulse 63   Temp 97.7  F (36.5  C) (Oral)   Resp 18   Ht 1.651 m (5' 5\")   Wt 71.7 kg (158 lb)   BMI 26.29 kg/m    Body mass index is 26.29 kg/m .  Physical Exam  Vitals signs and nursing note reviewed.   Constitutional:       General: He is not in acute distress.     Appearance: Normal appearance. He " is normal weight. He is not ill-appearing, toxic-appearing or diaphoretic.   Neck:      Musculoskeletal: Normal range of motion and neck supple. No neck rigidity or muscular tenderness.      Vascular: No carotid bruit.   Abdominal:      Hernia: There is no hernia in the right inguinal area or left inguinal area.   Genitourinary:     Penis: Normal and circumcised.       Scrotum/Testes:         Right: Mass, tenderness, swelling, testicular hydrocele or varicocele not present. Right testis is descended. Cremasteric reflex is present.          Left: Mass, tenderness, swelling, testicular hydrocele or varicocele not present. Left testis is descended. Cremasteric reflex is present.       Epididymis:      Right: Normal. Not inflamed or enlarged. No mass or tenderness.      Left: Normal. Not inflamed or enlarged. No mass or tenderness.   Lymphadenopathy:      Cervical: No cervical adenopathy.      Lower Body: No right inguinal adenopathy. No left inguinal adenopathy.   Neurological:      Mental Status: He is alert.                    Assessment & Plan     1. Hematospermia  Patient is here for concern of blood in ejaculate started about 1 month ago,   Occurs every time he ejaculates .   He has  not been sexually active for several years . He does self induced ejaculation   No hx of  recent biopsy (within past six weeks) of the prostate gland  No recent treatment for prostate cancer, either external beam radiation or brachytherapy  No symptoms of urethritis such as dysuria or urethral discharge , No Lower urinary tract symptoms  Patient is currently taking low dose aspirin   No recent travel  Father  from unknown cancer  Brother  of head and neck cancer   Brother  of pancreas cancer   No hx of prostate cancer in the family   Discussed with patient diagnosis and treatment options.  Labs as below.  Since hematospermia is persistent and more than a month we will refer patient to urology for possible transurethral  "ultrasound versus biopsy.  Patient verbalized understanding and agreed on the plan of care.  All questions answered.  - UA with Microscopic reflex to Culture  - Chlamydia trachomatis PCR  - Neisseria gonorrhoeae PCR  - PSA, screen  - UROLOGY ADULT REFERRAL    2. Essential hypertension with goal blood pressure less than 140/90  Patient has a history of hypertension.  He is currently on atenolol 100 mg.  Patient is compliant with medications  Initial blood pressure was elevated 170/100, repeat 135/84.  Advised to continue atenolol 100 mg for now.        BMI:   Estimated body mass index is 26.29 kg/m  as calculated from the following:    Height as of this encounter: 1.651 m (5' 5\").    Weight as of this encounter: 71.7 kg (158 lb).   Weight management plan: Discussed healthy diet and exercise guidelines        Work on weight loss  Regular exercise    No follow-ups on file.    Nica Greene MD  [facility]    "

## 2020-02-21 LAB
C TRACH DNA SPEC QL NAA+PROBE: NEGATIVE
N GONORRHOEA DNA SPEC QL NAA+PROBE: NEGATIVE
SPECIMEN SOURCE: NORMAL
SPECIMEN SOURCE: NORMAL

## 2020-04-20 DIAGNOSIS — T63.441A BEE STING REACTION, ACCIDENTAL OR UNINTENTIONAL, INITIAL ENCOUNTER: ICD-10-CM

## 2020-04-21 RX ORDER — EPINEPHRINE 0.3 MG/.3ML
INJECTION SUBCUTANEOUS
Qty: 2 EACH | Refills: 1 | Status: SHIPPED | OUTPATIENT
Start: 2020-04-21 | End: 2021-08-05

## 2020-05-21 ENCOUNTER — PATIENT OUTREACH (OUTPATIENT)
Dept: FAMILY MEDICINE | Facility: CLINIC | Age: 70
End: 2020-05-21

## 2020-05-21 NOTE — LETTER
Miko Gomez  [address] [address] [place] [id number]          Miko Esparza,     Our records indicate that you have not scheduled for a(n) appointment with your primary care provider for your Medicare Wellness Exam which is due now and recommended by your health care team. Managing your preventative and chronic health conditions is important to us. During the COVID-19 pandemic, options are available for you to safely get the care you need. If you have received your health care elsewhere, please provide us with that information so it can be documented in your chart.    Video Visit:  Please schedule a video visit. Request an appointment through [employer] or call us at [phone].    A video visit is a two-way, real-time, interactive video and audio appointment with your provider, using a secure brunilda called [employer] Touchpoint on your mobile device or a browser on your computer. Video visits will be billed the same as in-person visits, including deductibles and co-insurance.       Thank you for choosing us as a partner in health care.     Your [facility] Care Team/scj

## 2020-05-21 NOTE — TELEPHONE ENCOUNTER
Panel Management Review      Patient has the following on his problem list:       IVD   ASA: Passed    Last LDL:    Lab Results   Component Value Date    CHOL 157 10/24/2019     Lab Results   Component Value Date    HDL 60 10/24/2019     Lab Results   Component Value Date    LDL 58 10/24/2019     Lab Results   Component Value Date    TRIG 197 10/24/2019        Lab Results   Component Value Date    CHOLHDLRATIO 2.9 09/28/2015        Is the patient on a Statin? YES   Is the patient on Aspirin? YES                  Medications     HMG CoA Reductase Inhibitors     atorvastatin (LIPITOR) 20 MG tablet       Salicylates     aspirin (SB LOW DOSE ASA EC) 81 MG EC tablet             Last three blood pressure readings:  BP Readings from Last 3 Encounters:   02/20/20 135/84   10/30/19 139/88   08/27/19 (!) 148/88        Tobacco History:     History   Smoking Status     Current Every Day Smoker     Packs/day: 1.00     Years: 40.00     Types: Cigarettes   Smokeless Tobacco     Never Used       Hypertension   Last three blood pressure readings:  BP Readings from Last 3 Encounters:   02/20/20 135/84   10/30/19 139/88   08/27/19 (!) 148/88     Blood pressure: Passed    HTN Guidelines:  Less than 140/90      Composite cancer screening  Chart review shows that this patient is due/due soon for the following None  Summary:    Patient is due/failing the following:   PHYSICAL    Action needed:   Patient needs office visit for physical/medicare.    Type of outreach:    Sent [employer] message. and Sent letter.    Questions for provider review:    None                                                                                                                                    Kaylah Henriquez CMA       Chart closed

## 2020-09-03 DIAGNOSIS — I10 ESSENTIAL HYPERTENSION WITH GOAL BLOOD PRESSURE LESS THAN 140/90: ICD-10-CM

## 2020-09-03 DIAGNOSIS — E78.00 HIGH CHOLESTEROL: ICD-10-CM

## 2020-09-03 RX ORDER — ATENOLOL 100 MG/1
TABLET ORAL
Qty: 90 TABLET | Refills: 0 | Status: SHIPPED | OUTPATIENT
Start: 2020-09-03 | End: 2020-10-13

## 2020-09-03 RX ORDER — LISINOPRIL 20 MG/1
TABLET ORAL
Qty: 90 TABLET | Refills: 0 | Status: SHIPPED | OUTPATIENT
Start: 2020-09-03 | End: 2020-10-14

## 2020-09-03 RX ORDER — ATORVASTATIN CALCIUM 20 MG/1
TABLET, FILM COATED ORAL
Qty: 90 TABLET | Refills: 0 | Status: SHIPPED | OUTPATIENT
Start: 2020-09-03 | End: 2020-10-14

## 2020-09-03 NOTE — LETTER
September 3, 2020    Miko Gomez  [address] [address] [place] [address]    Dear Miko,       We recently received a refill request for atenolol (TENORMIN), lisinopril (ZESTRIL) & atorvastatin (LIPITOR).  We have refilled this for a one time 90 day supply only because you are due for a:    Blood Pressure & Cholesterol office visit and a Fasting lab appointment for FURTHER REFILLS.      Please schedule this lab appointment 4-5 days prior to the office visit.     Please call at your earliest convenience so that there will not be a delay with your future refills.          Thank you,   Your [facility] Team/[facility]  [phone]

## 2020-09-03 NOTE — TELEPHONE ENCOUNTER
Medication is being filled for 1 time refill only due to:  Patient needs to be seen for fasting lab appointment and appointment with the provider for further refills.  Marisa RIVASN, RN

## 2020-10-02 ENCOUNTER — DOCUMENTATION ONLY (OUTPATIENT)
Dept: FAMILY MEDICINE | Facility: CLINIC | Age: 70
End: 2020-10-02

## 2020-10-02 DIAGNOSIS — E78.00 HIGH CHOLESTEROL: ICD-10-CM

## 2020-10-02 DIAGNOSIS — I10 HYPERTENSION GOAL BP (BLOOD PRESSURE) < 140/90: Primary | ICD-10-CM

## 2020-10-02 NOTE — PROGRESS NOTES
Pt has a lab appointment on 10.5.2020, please review their chart and add orders if necessary.    Thank you,    [facility]

## 2020-10-06 DIAGNOSIS — E78.00 HIGH CHOLESTEROL: ICD-10-CM

## 2020-10-06 DIAGNOSIS — I10 HYPERTENSION GOAL BP (BLOOD PRESSURE) < 140/90: ICD-10-CM

## 2020-10-06 LAB
ALBUMIN SERPL-MCNC: 3.6 G/DL (ref 3.4–5)
ALP SERPL-CCNC: 57 U/L (ref 40–150)
ALT SERPL W P-5'-P-CCNC: 39 U/L (ref 0–70)
ANION GAP SERPL CALCULATED.3IONS-SCNC: 4 MMOL/L (ref 3–14)
AST SERPL W P-5'-P-CCNC: 22 U/L (ref 0–45)
BILIRUB SERPL-MCNC: 0.8 MG/DL (ref 0.2–1.3)
BUN SERPL-MCNC: 16 MG/DL (ref 7–30)
CALCIUM SERPL-MCNC: 9 MG/DL (ref 8.5–10.1)
CHLORIDE SERPL-SCNC: 104 MMOL/L (ref 94–109)
CHOLEST SERPL-MCNC: 177 MG/DL
CO2 SERPL-SCNC: 29 MMOL/L (ref 20–32)
CREAT SERPL-MCNC: 0.87 MG/DL (ref 0.66–1.25)
GFR SERPL CREATININE-BSD FRML MDRD: 88 ML/MIN/{1.73_M2}
GLUCOSE SERPL-MCNC: 89 MG/DL (ref 70–99)
HDLC SERPL-MCNC: 76 MG/DL
LDLC SERPL CALC-MCNC: 80 MG/DL
NONHDLC SERPL-MCNC: 101 MG/DL
POTASSIUM SERPL-SCNC: 4.2 MMOL/L (ref 3.4–5.3)
PROT SERPL-MCNC: 7.6 G/DL (ref 6.8–8.8)
SODIUM SERPL-SCNC: 137 MMOL/L (ref 133–144)
TRIGL SERPL-MCNC: 103 MG/DL

## 2020-10-06 PROCEDURE — 80061 LIPID PANEL: CPT | Performed by: FAMILY MEDICINE

## 2020-10-06 PROCEDURE — 80053 COMPREHEN METABOLIC PANEL: CPT | Performed by: FAMILY MEDICINE

## 2020-10-06 PROCEDURE — 36415 COLL VENOUS BLD VENIPUNCTURE: CPT | Performed by: FAMILY MEDICINE

## 2020-10-14 ENCOUNTER — OFFICE VISIT (OUTPATIENT)
Dept: FAMILY MEDICINE | Facility: CLINIC | Age: 70
End: 2020-10-14
Payer: COMMERCIAL

## 2020-10-14 VITALS
OXYGEN SATURATION: 99 % | WEIGHT: 157 LBS | RESPIRATION RATE: 16 BRPM | DIASTOLIC BLOOD PRESSURE: 88 MMHG | SYSTOLIC BLOOD PRESSURE: 138 MMHG | BODY MASS INDEX: 26.13 KG/M2 | TEMPERATURE: 97.7 F | HEART RATE: 68 BPM

## 2020-10-14 DIAGNOSIS — I10 ESSENTIAL HYPERTENSION WITH GOAL BLOOD PRESSURE LESS THAN 140/90: ICD-10-CM

## 2020-10-14 DIAGNOSIS — E78.00 HIGH CHOLESTEROL: ICD-10-CM

## 2020-10-14 PROCEDURE — 99214 OFFICE O/P EST MOD 30 MIN: CPT | Performed by: FAMILY MEDICINE

## 2020-10-14 RX ORDER — LISINOPRIL 20 MG/1
20 TABLET ORAL DAILY
Qty: 90 TABLET | Refills: 0 | Status: CANCELLED | OUTPATIENT
Start: 2020-10-14

## 2020-10-14 RX ORDER — LISINOPRIL 20 MG/1
20 TABLET ORAL DAILY
Qty: 90 TABLET | Refills: 1 | Status: SHIPPED | OUTPATIENT
Start: 2020-10-14 | End: 2021-12-27

## 2020-10-14 RX ORDER — ATORVASTATIN CALCIUM 20 MG/1
20 TABLET, FILM COATED ORAL DAILY
Qty: 90 TABLET | Refills: 0 | Status: CANCELLED | OUTPATIENT
Start: 2020-10-14

## 2020-10-14 RX ORDER — ATENOLOL 100 MG/1
100 TABLET ORAL DAILY
Qty: 90 TABLET | Refills: 1 | Status: SHIPPED | OUTPATIENT
Start: 2020-10-14 | End: 2021-12-27

## 2020-10-14 NOTE — PROGRESS NOTES
SUBJECTIVE:  69 year oldyear old male enters with  hypertension.  Pt. Has been compliant with medications and medications were reviewed.  No side effects. No chest pain or sob. Low sodium diet.    Current Outpatient Medications:      aspirin (SB LOW DOSE ASA EC) 81 MG EC tablet, Take 81 mg by mouth daily., Disp: , Rfl:      atenolol (TENORMIN) 100 MG tablet, TAKE ONE TABLET BY MOUTH ONCE DAILY, Disp: 90 tablet, Rfl: 0     EPINEPHrine (ANY BX GENERIC EQUIV) 0.3 MG/0.3ML injection 2-pack, INJECT ONE DEVICE INTO THE MUSCLE AS NEEDED FOR ALLERGIC REACTION, Disp: 2 each, Rfl: 1     lisinopril (ZESTRIL) 20 MG tablet, TAKE ONE TABLET BY MOUTH ONCE DAILY, Disp: 90 tablet, Rfl: 0     Multiple Vitamins-Minerals (CENTRUM SILVER) CHEW, Take  by mouth., Disp: , Rfl:   Past Medical History:   Diagnosis Date     HTN      Orders Only on 10/06/2020   Component Date Value Ref Range Status     Cholesterol 10/06/2020 177  <200 mg/dL Final     Triglycerides 10/06/2020 103  <150 mg/dL Final    Fasting specimen     HDL Cholesterol 10/06/2020 76  >39 mg/dL Final     LDL Cholesterol Calculated 10/06/2020 80  <100 mg/dL Final    Desirable:       <100 mg/dl     Non HDL Cholesterol 10/06/2020 101  <130 mg/dL Final     Sodium 10/06/2020 137  133 - 144 mmol/L Final     Potassium 10/06/2020 4.2  3.4 - 5.3 mmol/L Final     Chloride 10/06/2020 104  94 - 109 mmol/L Final     Carbon Dioxide 10/06/2020 29  20 - 32 mmol/L Final     Anion Gap 10/06/2020 4  3 - 14 mmol/L Final     Glucose 10/06/2020 89  70 - 99 mg/dL Final    Fasting specimen     Urea Nitrogen 10/06/2020 16  7 - 30 mg/dL Final     Creatinine 10/06/2020 0.87  0.66 - 1.25 mg/dL Final     GFR Estimate 10/06/2020 88  >60 mL/min/[1.73_m2] Final    Comment: Non  GFR Calc  Starting 12/18/2018, serum creatinine based estimated GFR (eGFR) will be   calculated using the Chronic Kidney Disease Epidemiology Collaboration   (CKD-EPI) equation.       GFR Estimate If Black 10/06/2020  >90  >60 mL/min/[1.73_m2] Final    Comment:  GFR Calc  Starting 12/18/2018, serum creatinine based estimated GFR (eGFR) will be   calculated using the Chronic Kidney Disease Epidemiology Collaboration   (CKD-EPI) equation.       Calcium 10/06/2020 9.0  8.5 - 10.1 mg/dL Final     Bilirubin Total 10/06/2020 0.8  0.2 - 1.3 mg/dL Final     Albumin 10/06/2020 3.6  3.4 - 5.0 g/dL Final     Protein Total 10/06/2020 7.6  6.8 - 8.8 g/dL Final     Alkaline Phosphatase 10/06/2020 57  40 - 150 U/L Final     ALT 10/06/2020 39  0 - 70 U/L Final     AST 10/06/2020 22  0 - 45 U/L Final      Reviewed health maintenance  Patient Active Problem List   Diagnosis     CARDIOVASCULAR SCREENING; LDL GOAL LESS THAN 130     Hypertension goal BP (blood pressure) < 140/90     Advanced directives, counseling/discussion     High cholesterol         OBJECTIVE:  no apparent distress  /88   Pulse 68   Temp 97.7  F (36.5  C) (Tympanic)   Resp 16   Wt 71.2 kg (157 lb)   SpO2 99%   BMI 26.13 kg/m       Head: Normocephalic. No masses, lesions, tenderness or abnormalities.  Neck::Neck supple. No adenopathy. Thyroid symmetric, normal size.    Cardiovascular: negative. No lifts, heaves, or thrills. RRR. No murmurs, clicks gallops or rubs  Respiratory. Good diaphragmatic excursion. Lungs clear  Gastrointestinal:Abdomen soft, non-tender.  No masses, organomegaly    Orders Only on 10/06/2020   Component Date Value Ref Range Status     Cholesterol 10/06/2020 177  <200 mg/dL Final     Triglycerides 10/06/2020 103  <150 mg/dL Final    Fasting specimen     HDL Cholesterol 10/06/2020 76  >39 mg/dL Final     LDL Cholesterol Calculated 10/06/2020 80  <100 mg/dL Final    Desirable:       <100 mg/dl     Non HDL Cholesterol 10/06/2020 101  <130 mg/dL Final     Sodium 10/06/2020 137  133 - 144 mmol/L Final     Potassium 10/06/2020 4.2  3.4 - 5.3 mmol/L Final     Chloride 10/06/2020 104  94 - 109 mmol/L Final     Carbon Dioxide 10/06/2020 29   20 - 32 mmol/L Final     Anion Gap 10/06/2020 4  3 - 14 mmol/L Final     Glucose 10/06/2020 89  70 - 99 mg/dL Final    Fasting specimen     Urea Nitrogen 10/06/2020 16  7 - 30 mg/dL Final     Creatinine 10/06/2020 0.87  0.66 - 1.25 mg/dL Final     GFR Estimate 10/06/2020 88  >60 mL/min/[1.73_m2] Final    Comment: Non  GFR Calc  Starting 12/18/2018, serum creatinine based estimated GFR (eGFR) will be   calculated using the Chronic Kidney Disease Epidemiology Collaboration   (CKD-EPI) equation.       GFR Estimate If Black 10/06/2020 >90  >60 mL/min/[1.73_m2] Final    Comment:  GFR Calc  Starting 12/18/2018, serum creatinine based estimated GFR (eGFR) will be   calculated using the Chronic Kidney Disease Epidemiology Collaboration   (CKD-EPI) equation.       Calcium 10/06/2020 9.0  8.5 - 10.1 mg/dL Final     Bilirubin Total 10/06/2020 0.8  0.2 - 1.3 mg/dL Final     Albumin 10/06/2020 3.6  3.4 - 5.0 g/dL Final     Protein Total 10/06/2020 7.6  6.8 - 8.8 g/dL Final     Alkaline Phosphatase 10/06/2020 57  40 - 150 U/L Final     ALT 10/06/2020 39  0 - 70 U/L Final     AST 10/06/2020 22  0 - 45 U/L Final         ICD-10-CM    1. Essential hypertension with goal blood pressure less than 140/90  I10 atenolol (TENORMIN) 100 MG tablet   2. High cholesterol  E78.00     PLAN: Follow up in 6 months       SUBJECTIVE:  69 year old enters for recheck of high cholesterol.  Pt. Has been taking med and has no side effects. Pt is following diet.  Denies chest pain and SOB.  Past Medical History:   Diagnosis Date     HTN      Past Surgical History:   Procedure Laterality Date     C ORAL SURGERY PROCEDURE       ENT SURGERY      tonsils       Current Outpatient Medications:      aspirin (SB LOW DOSE ASA EC) 81 MG EC tablet, Take 81 mg by mouth daily., Disp: , Rfl:      atenolol (TENORMIN) 100 MG tablet, TAKE ONE TABLET BY MOUTH ONCE DAILY, Disp: 90 tablet, Rfl: 0     EPINEPHrine (ANY BX GENERIC EQUIV) 0.3  MG/0.3ML injection 2-pack, INJECT ONE DEVICE INTO THE MUSCLE AS NEEDED FOR ALLERGIC REACTION, Disp: 2 each, Rfl: 1     lisinopril (ZESTRIL) 20 MG tablet, TAKE ONE TABLET BY MOUTH ONCE DAILY, Disp: 90 tablet, Rfl: 0     Multiple Vitamins-Minerals (CENTRUM SILVER) CHEW, Take  by mouth., Disp: , Rfl:   Reviewed health maintenance   Patient Active Problem List   Diagnosis     CARDIOVASCULAR SCREENING; LDL GOAL LESS THAN 130     Hypertension goal BP (blood pressure) < 140/90     Advanced directives, counseling/discussion     High cholesterol       OBJECTIVE:  no apparent distress  /88   Pulse 68   Temp 97.7  F (36.5  C) (Tympanic)   Resp 16   Wt 71.2 kg (157 lb)   SpO2 99%   BMI 26.13 kg/m        Exam:  Constitutional: healthy, alert and no distress  Head: Normocephalic. No masses, lesions, tenderness or abnormalities  Neck: Neck supple. No adenopathy. Thyroid symmetric, normal size,  Cardiovascular: negative, PMI normal. No lifts, heaves, or thrills. RRR. No murmurs, clicks gallops or rub  Respiratory: negative Lungs clear    Orders Only on 10/06/2020   Component Date Value Ref Range Status     Cholesterol 10/06/2020 177  <200 mg/dL Final     Triglycerides 10/06/2020 103  <150 mg/dL Final    Fasting specimen     HDL Cholesterol 10/06/2020 76  >39 mg/dL Final     LDL Cholesterol Calculated 10/06/2020 80  <100 mg/dL Final    Desirable:       <100 mg/dl     Non HDL Cholesterol 10/06/2020 101  <130 mg/dL Final     Sodium 10/06/2020 137  133 - 144 mmol/L Final     Potassium 10/06/2020 4.2  3.4 - 5.3 mmol/L Final     Chloride 10/06/2020 104  94 - 109 mmol/L Final     Carbon Dioxide 10/06/2020 29  20 - 32 mmol/L Final     Anion Gap 10/06/2020 4  3 - 14 mmol/L Final     Glucose 10/06/2020 89  70 - 99 mg/dL Final    Fasting specimen     Urea Nitrogen 10/06/2020 16  7 - 30 mg/dL Final     Creatinine 10/06/2020 0.87  0.66 - 1.25 mg/dL Final     GFR Estimate 10/06/2020 88  >60 mL/min/[1.73_m2] Final    Comment: Non   GFR Calc  Starting 12/18/2018, serum creatinine based estimated GFR (eGFR) will be   calculated using the Chronic Kidney Disease Epidemiology Collaboration   (CKD-EPI) equation.       GFR Estimate If Black 10/06/2020 >90  >60 mL/min/[1.73_m2] Final    Comment:  GFR Calc  Starting 12/18/2018, serum creatinine based estimated GFR (eGFR) will be   calculated using the Chronic Kidney Disease Epidemiology Collaboration   (CKD-EPI) equation.       Calcium 10/06/2020 9.0  8.5 - 10.1 mg/dL Final     Bilirubin Total 10/06/2020 0.8  0.2 - 1.3 mg/dL Final     Albumin 10/06/2020 3.6  3.4 - 5.0 g/dL Final     Protein Total 10/06/2020 7.6  6.8 - 8.8 g/dL Final     Alkaline Phosphatase 10/06/2020 57  40 - 150 U/L Final     ALT 10/06/2020 39  0 - 70 U/L Final     AST 10/06/2020 22  0 - 45 U/L Final     The 10-year ASCVD risk score (Julia WAGNER Jr., et al., 2013) is: 20%    Values used to calculate the score:      Age: 69 years      Sex: Male      Is Non- : No      Diabetic: No      Tobacco smoker: Yes      Systolic Blood Pressure: 138 mmHg      Is BP treated: Yes      HDL Cholesterol: 76 mg/dL      Total Cholesterol: 177 mg/dL       ICD-10-CM    1. Essential hypertension with goal blood pressure less than 140/90  I10 atenolol (TENORMIN) 100 MG tablet   2. High cholesterol  E78.00     PLAN: Follow up in 1 year

## 2021-01-15 ENCOUNTER — HEALTH MAINTENANCE LETTER (OUTPATIENT)
Age: 71
End: 2021-01-15

## 2021-02-02 DIAGNOSIS — E78.00 HIGH CHOLESTEROL: ICD-10-CM

## 2021-02-03 RX ORDER — ATORVASTATIN CALCIUM 20 MG/1
TABLET, FILM COATED ORAL
Qty: 90 TABLET | Refills: 0 | Status: SHIPPED | OUTPATIENT
Start: 2021-02-03 | End: 2021-07-27

## 2021-02-03 NOTE — TELEPHONE ENCOUNTER
Routing refill request to provider for review/approval because:  Drug not active on patient's medication list   Shani Alberto RN

## 2021-07-27 DIAGNOSIS — E78.00 HIGH CHOLESTEROL: ICD-10-CM

## 2021-07-27 RX ORDER — ATORVASTATIN CALCIUM 20 MG/1
TABLET, FILM COATED ORAL
Qty: 90 TABLET | Refills: 0 | Status: SHIPPED | OUTPATIENT
Start: 2021-07-27 | End: 2022-02-01

## 2021-08-05 DIAGNOSIS — T63.441A BEE STING REACTION, ACCIDENTAL OR UNINTENTIONAL, INITIAL ENCOUNTER: ICD-10-CM

## 2021-08-05 RX ORDER — EPINEPHRINE 0.3 MG/.3ML
INJECTION SUBCUTANEOUS
Qty: 2 EACH | Refills: 1 | Status: SHIPPED | OUTPATIENT
Start: 2021-08-05 | End: 2023-05-19

## 2021-08-09 ENCOUNTER — ALLIED HEALTH/NURSE VISIT (OUTPATIENT)
Dept: FAMILY MEDICINE | Facility: CLINIC | Age: 71
End: 2021-08-09
Payer: COMMERCIAL

## 2021-08-09 VITALS — DIASTOLIC BLOOD PRESSURE: 76 MMHG | SYSTOLIC BLOOD PRESSURE: 122 MMHG | HEART RATE: 58 BPM

## 2021-08-09 DIAGNOSIS — Z01.30 BLOOD PRESSURE CHECK: Primary | ICD-10-CM

## 2021-08-09 PROCEDURE — 99207 PR NO CHARGE LOS: CPT | Performed by: FAMILY MEDICINE

## 2021-08-09 NOTE — PROGRESS NOTES
Miko Gomez was evaluated at [facility] on August 9, 2021 at which time his blood pressure was:    BP Readings from Last 3 Encounters:   10/14/20 138/88   02/20/20 135/84   10/30/19 139/88     Pulse Readings from Last 3 Encounters:   10/14/20 68   02/20/20 63   10/30/19 56       Reviewed lifestyle modifications for blood pressure control and reduction: including making healthy food choices, managing weight, getting regular exercise, smoking cessation, reducing alcohol consumption, monitoring blood pressure regularly.     Symptoms: None    BP Goal:< 140/90 mmHg    BP Assessment:  BP at goal    Potential Reasons for BP too high: NA - Not applicable    BP Follow-Up Plan: Recheck BP in 6 months at pharmacy    Recommendation to Provider: n/a     Note completed by: Kristina Barrientos, Pharm D  [facility]  [phone]

## 2021-09-04 ENCOUNTER — HEALTH MAINTENANCE LETTER (OUTPATIENT)
Age: 71
End: 2021-09-04

## 2021-12-27 DIAGNOSIS — I10 ESSENTIAL HYPERTENSION WITH GOAL BLOOD PRESSURE LESS THAN 140/90: ICD-10-CM

## 2021-12-27 RX ORDER — ATENOLOL 100 MG/1
100 TABLET ORAL DAILY
Qty: 30 TABLET | Refills: 0 | Status: SHIPPED | OUTPATIENT
Start: 2021-12-27 | End: 2022-02-01

## 2021-12-27 RX ORDER — LISINOPRIL 20 MG/1
20 TABLET ORAL DAILY
Qty: 90 TABLET | Refills: 1 | Status: SHIPPED | OUTPATIENT
Start: 2021-12-27 | End: 2022-02-01

## 2021-12-27 NOTE — TELEPHONE ENCOUNTER
Needs to be seen in the next month by me with  Pre visit labs. May add to the last half of a 30 minute appointment. Do not send letter but call to make the appointment.

## 2021-12-27 NOTE — LETTER
December 28, 2021    Miko Gomez  [address] [address] [place] [address]    Dear Miko,       We recently received a refill request for atenolol (TENORMIN) 100 MG tablet, lisinopril (ZESTRIL) 20 MG tablet.  We have refilled this for a one time 30 day supply only because you are due for a:    Blood Pressure office visit and fasting  lab appointment with in the next 30 days.      Please schedule this lab appointment 4-5 days prior to the office visit.     Please call at your earliest convenience so that there will not be a delay with your future refills.          Thank you,   Your [facility] Team/  [phone]

## 2021-12-28 NOTE — TELEPHONE ENCOUNTER
I tried to call [phone] to speak with Miko, the person that answered the phone stated that it is no longer his number.  I had to send a letter.  Mara Mccabe

## 2022-01-04 ENCOUNTER — DOCUMENTATION ONLY (OUTPATIENT)
Dept: LAB | Facility: CLINIC | Age: 72
End: 2022-01-04
Payer: MEDICARE

## 2022-01-04 DIAGNOSIS — I10 HYPERTENSION GOAL BP (BLOOD PRESSURE) < 140/90: ICD-10-CM

## 2022-01-04 DIAGNOSIS — E78.00 HIGH CHOLESTEROL: Primary | ICD-10-CM

## 2022-01-08 ENCOUNTER — LAB (OUTPATIENT)
Dept: LAB | Facility: CLINIC | Age: 72
End: 2022-01-08
Payer: MEDICARE

## 2022-01-08 DIAGNOSIS — I10 HYPERTENSION GOAL BP (BLOOD PRESSURE) < 140/90: ICD-10-CM

## 2022-01-08 DIAGNOSIS — E78.00 HIGH CHOLESTEROL: ICD-10-CM

## 2022-01-08 PROCEDURE — 80053 COMPREHEN METABOLIC PANEL: CPT

## 2022-01-08 PROCEDURE — 36415 COLL VENOUS BLD VENIPUNCTURE: CPT

## 2022-01-08 PROCEDURE — 80061 LIPID PANEL: CPT

## 2022-01-10 LAB
ALBUMIN SERPL-MCNC: 4 G/DL (ref 3.4–5)
ALP SERPL-CCNC: 59 U/L (ref 40–150)
ALT SERPL W P-5'-P-CCNC: 40 U/L (ref 0–70)
ANION GAP SERPL CALCULATED.3IONS-SCNC: 9 MMOL/L (ref 3–14)
AST SERPL W P-5'-P-CCNC: 30 U/L (ref 0–45)
BILIRUB SERPL-MCNC: 0.7 MG/DL (ref 0.2–1.3)
BUN SERPL-MCNC: 11 MG/DL (ref 7–30)
CALCIUM SERPL-MCNC: 9.1 MG/DL (ref 8.5–10.1)
CHLORIDE BLD-SCNC: 101 MMOL/L (ref 94–109)
CHOLEST SERPL-MCNC: 258 MG/DL
CO2 SERPL-SCNC: 26 MMOL/L (ref 20–32)
CREAT SERPL-MCNC: 0.84 MG/DL (ref 0.66–1.25)
FASTING STATUS PATIENT QL REPORTED: NO
GFR SERPL CREATININE-BSD FRML MDRD: >90 ML/MIN/1.73M2
GLUCOSE BLD-MCNC: 88 MG/DL (ref 70–99)
HDLC SERPL-MCNC: 64 MG/DL
LDLC SERPL CALC-MCNC: 175 MG/DL
NONHDLC SERPL-MCNC: 194 MG/DL
POTASSIUM BLD-SCNC: 4.1 MMOL/L (ref 3.4–5.3)
PROT SERPL-MCNC: 8 G/DL (ref 6.8–8.8)
SODIUM SERPL-SCNC: 136 MMOL/L (ref 133–144)
TRIGL SERPL-MCNC: 93 MG/DL

## 2022-02-02 ENCOUNTER — OFFICE VISIT (OUTPATIENT)
Dept: FAMILY MEDICINE | Facility: CLINIC | Age: 72
End: 2022-02-02
Payer: MEDICARE

## 2022-02-02 VITALS
WEIGHT: 155 LBS | HEIGHT: 67 IN | BODY MASS INDEX: 24.33 KG/M2 | SYSTOLIC BLOOD PRESSURE: 136 MMHG | RESPIRATION RATE: 16 BRPM | OXYGEN SATURATION: 99 % | HEART RATE: 73 BPM | TEMPERATURE: 97.1 F | DIASTOLIC BLOOD PRESSURE: 87 MMHG

## 2022-02-02 DIAGNOSIS — E78.00 HIGH CHOLESTEROL: ICD-10-CM

## 2022-02-02 DIAGNOSIS — I10 ESSENTIAL HYPERTENSION WITH GOAL BLOOD PRESSURE LESS THAN 140/90: ICD-10-CM

## 2022-02-02 PROCEDURE — 99214 OFFICE O/P EST MOD 30 MIN: CPT | Performed by: FAMILY MEDICINE

## 2022-02-02 RX ORDER — LISINOPRIL 20 MG/1
20 TABLET ORAL DAILY
Qty: 90 TABLET | Refills: 3 | Status: SHIPPED | OUTPATIENT
Start: 2022-02-02 | End: 2023-04-26

## 2022-02-02 RX ORDER — ATORVASTATIN CALCIUM 20 MG/1
20 TABLET, FILM COATED ORAL DAILY
Qty: 90 TABLET | Refills: 3 | Status: SHIPPED | OUTPATIENT
Start: 2022-02-02 | End: 2023-03-30

## 2022-02-02 RX ORDER — ATENOLOL 100 MG/1
100 TABLET ORAL DAILY
Qty: 90 TABLET | Refills: 3 | Status: SHIPPED | OUTPATIENT
Start: 2022-02-02 | End: 2023-04-18

## 2022-02-02 ASSESSMENT — MIFFLIN-ST. JEOR: SCORE: 1421.71

## 2022-02-02 NOTE — PROGRESS NOTES
SUBJECTIVE:  71 year oldyear old male enters with  hypertension.  Pt. Has been compliant with medications and medications were reviewed.  No side effects. No chest pain or sob. Low sodium diet.    Current Outpatient Medications:      atenolol (TENORMIN) 100 MG tablet, TAKE 1 TABLET (100 MG) BY MOUTH DAILY, Disp: 30 tablet, Rfl: 0     atorvastatin (LIPITOR) 20 MG tablet, TAKE ONE TABLET BY MOUTH ONCE DAILY, Disp: 90 tablet, Rfl: 0     EPINEPHrine (ANY BX GENERIC EQUIV) 0.3 MG/0.3ML injection 2-pack, INJECT ONE DEVICE INTO THE MUSCLE AS NEEDED FOR ALLERGIC REACTION, Disp: 2 each, Rfl: 1     lisinopril (ZESTRIL) 20 MG tablet, TAKE 1 TABLET (20 MG) BY MOUTH DAILY, Disp: 90 tablet, Rfl: 1     Multiple Vitamins-Minerals (CENTRUM SILVER) CHEW, Take  by mouth., Disp: , Rfl:   Past Medical History:   Diagnosis Date     HTN      Lab on 01/08/2022   Component Date Value Ref Range Status     Sodium 01/08/2022 136  133 - 144 mmol/L Final     Potassium 01/08/2022 4.1  3.4 - 5.3 mmol/L Final     Chloride 01/08/2022 101  94 - 109 mmol/L Final     Carbon Dioxide (CO2) 01/08/2022 26  20 - 32 mmol/L Final     Anion Gap 01/08/2022 9  3 - 14 mmol/L Final     Urea Nitrogen 01/08/2022 11  7 - 30 mg/dL Final     Creatinine 01/08/2022 0.84  0.66 - 1.25 mg/dL Final     Calcium 01/08/2022 9.1  8.5 - 10.1 mg/dL Final     Glucose 01/08/2022 88  70 - 99 mg/dL Final     Alkaline Phosphatase 01/08/2022 59  40 - 150 U/L Final     AST 01/08/2022 30  0 - 45 U/L Final     ALT 01/08/2022 40  0 - 70 U/L Final     Protein Total 01/08/2022 8.0  6.8 - 8.8 g/dL Final     Albumin 01/08/2022 4.0  3.4 - 5.0 g/dL Final     Bilirubin Total 01/08/2022 0.7  0.2 - 1.3 mg/dL Final     GFR Estimate 01/08/2022 >90  >60 mL/min/1.73m2 Final    Effective December 21, 2021 eGFRcr in adults is calculated using the 2021 CKD-EPI creatinine equation which includes age and gender (Gifty et al., NEJM, DOI: 10.1056/RNLMus5642942)     Cholesterol 01/08/2022 258* <200 mg/dL Final  "    Triglycerides 01/08/2022 93  <150 mg/dL Final     Direct Measure HDL 01/08/2022 64  >=40 mg/dL Final     LDL Cholesterol Calculated 01/08/2022 175* <=100 mg/dL Final     Non HDL Cholesterol 01/08/2022 194* <130 mg/dL Final     Patient Fasting > 8hrs? 01/08/2022 No   Final      Reviewed health maintenance  Patient Active Problem List   Diagnosis     CARDIOVASCULAR SCREENING; LDL GOAL LESS THAN 130     Hypertension goal BP (blood pressure) < 140/90     Advanced directives, counseling/discussion     High cholesterol         OBJECTIVE:  no apparent distress  /87   Pulse 73   Temp 97.1  F (36.2  C) (Tympanic)   Resp 16   Ht 1.702 m (5' 7\")   Wt 70.3 kg (155 lb)   SpO2 99%   BMI 24.28 kg/m       Head: Normocephalic. No masses, lesions, tenderness or abnormalities.  Neck::Neck supple. No adenopathy. Thyroid symmetric, normal size.    Cardiovascular: negative. No lifts, heaves, or thrills. RRR. No murmurs, clicks gallops or rubs  Respiratory. Good diaphragmatic excursion. Lungs clear  Gastrointestinal:Abdomen soft, non-tender.  No masses, organomegaly    Lab on 01/08/2022   Component Date Value Ref Range Status     Sodium 01/08/2022 136  133 - 144 mmol/L Final     Potassium 01/08/2022 4.1  3.4 - 5.3 mmol/L Final     Chloride 01/08/2022 101  94 - 109 mmol/L Final     Carbon Dioxide (CO2) 01/08/2022 26  20 - 32 mmol/L Final     Anion Gap 01/08/2022 9  3 - 14 mmol/L Final     Urea Nitrogen 01/08/2022 11  7 - 30 mg/dL Final     Creatinine 01/08/2022 0.84  0.66 - 1.25 mg/dL Final     Calcium 01/08/2022 9.1  8.5 - 10.1 mg/dL Final     Glucose 01/08/2022 88  70 - 99 mg/dL Final     Alkaline Phosphatase 01/08/2022 59  40 - 150 U/L Final     AST 01/08/2022 30  0 - 45 U/L Final     ALT 01/08/2022 40  0 - 70 U/L Final     Protein Total 01/08/2022 8.0  6.8 - 8.8 g/dL Final     Albumin 01/08/2022 4.0  3.4 - 5.0 g/dL Final     Bilirubin Total 01/08/2022 0.7  0.2 - 1.3 mg/dL Final     GFR Estimate 01/08/2022 >90  >60 " "mL/min/1.73m2 Final    Effective December 21, 2021 eGFRcr in adults is calculated using the 2021 CKD-EPI creatinine equation which includes age and gender (Gifty et al., NE, DOI: 10.1056/RTJJvv2528928)     Cholesterol 01/08/2022 258* <200 mg/dL Final     Triglycerides 01/08/2022 93  <150 mg/dL Final     Direct Measure HDL 01/08/2022 64  >=40 mg/dL Final     LDL Cholesterol Calculated 01/08/2022 175* <=100 mg/dL Final     Non HDL Cholesterol 01/08/2022 194* <130 mg/dL Final     Patient Fasting > 8hrs? 01/08/2022 No   Final     SUBJECTIVE:  71 year old enters for recheck of high cholesterol.  Pt. Has been taking med and has no side effects. Pt is following diet.  Denies chest pain and SOB.  Past Medical History:   Diagnosis Date     HTN      Past Surgical History:   Procedure Laterality Date     ENT SURGERY      tonsils     ZZC ORAL SURGERY PROCEDURE         Current Outpatient Medications:      atenolol (TENORMIN) 100 MG tablet, Take 1 tablet (100 mg) by mouth daily, Disp: 90 tablet, Rfl: 3     atorvastatin (LIPITOR) 20 MG tablet, Take 1 tablet (20 mg) by mouth daily, Disp: 90 tablet, Rfl: 3     EPINEPHrine (ANY BX GENERIC EQUIV) 0.3 MG/0.3ML injection 2-pack, INJECT ONE DEVICE INTO THE MUSCLE AS NEEDED FOR ALLERGIC REACTION, Disp: 2 each, Rfl: 1     lisinopril (ZESTRIL) 20 MG tablet, Take 1 tablet (20 mg) by mouth daily, Disp: 90 tablet, Rfl: 3     Multiple Vitamins-Minerals (CENTRUM SILVER) CHEW, Take  by mouth., Disp: , Rfl:   Reviewed health maintenance   Patient Active Problem List   Diagnosis     CARDIOVASCULAR SCREENING; LDL GOAL LESS THAN 130     Hypertension goal BP (blood pressure) < 140/90     Advanced directives, counseling/discussion     High cholesterol       OBJECTIVE:  no apparent distress  /87   Pulse 73   Temp 97.1  F (36.2  C) (Tympanic)   Resp 16   Ht 1.702 m (5' 7\")   Wt 70.3 kg (155 lb)   SpO2 99%   BMI 24.28 kg/m        Exam:  Constitutional: healthy, alert and no distress  Head: " Normocephalic. No masses, lesions, tenderness or abnormalities  Neck: Neck supple. No adenopathy. Thyroid symmetric, normal size,  Cardiovascular: negative, PMI normal. No lifts, heaves, or thrills. RRR. No murmurs, clicks gallops or rub  Respiratory: negative Lungs clear    Lab on 01/08/2022   Component Date Value Ref Range Status     Sodium 01/08/2022 136  133 - 144 mmol/L Final     Potassium 01/08/2022 4.1  3.4 - 5.3 mmol/L Final     Chloride 01/08/2022 101  94 - 109 mmol/L Final     Carbon Dioxide (CO2) 01/08/2022 26  20 - 32 mmol/L Final     Anion Gap 01/08/2022 9  3 - 14 mmol/L Final     Urea Nitrogen 01/08/2022 11  7 - 30 mg/dL Final     Creatinine 01/08/2022 0.84  0.66 - 1.25 mg/dL Final     Calcium 01/08/2022 9.1  8.5 - 10.1 mg/dL Final     Glucose 01/08/2022 88  70 - 99 mg/dL Final     Alkaline Phosphatase 01/08/2022 59  40 - 150 U/L Final     AST 01/08/2022 30  0 - 45 U/L Final     ALT 01/08/2022 40  0 - 70 U/L Final     Protein Total 01/08/2022 8.0  6.8 - 8.8 g/dL Final     Albumin 01/08/2022 4.0  3.4 - 5.0 g/dL Final     Bilirubin Total 01/08/2022 0.7  0.2 - 1.3 mg/dL Final     GFR Estimate 01/08/2022 >90  >60 mL/min/1.73m2 Final    Effective December 21, 2021 eGFRcr in adults is calculated using the 2021 CKD-EPI creatinine equation which includes age and gender (Gifty et al., NEJM, DOI: 10.1056/EHQZsi1668734)     Cholesterol 01/08/2022 258* <200 mg/dL Final     Triglycerides 01/08/2022 93  <150 mg/dL Final     Direct Measure HDL 01/08/2022 64  >=40 mg/dL Final     LDL Cholesterol Calculated 01/08/2022 175* <=100 mg/dL Final     Non HDL Cholesterol 01/08/2022 194* <130 mg/dL Final     Patient Fasting > 8hrs? 01/08/2022 No   Final           ICD-10-CM    1. Essential hypertension with goal blood pressure less than 140/90  I10 atenolol (TENORMIN) 100 MG tablet     lisinopril (ZESTRIL) 20 MG tablet   2. High cholesterol  E78.00 atorvastatin (LIPITOR) 20 MG tablet    PLAN: Follow up in 1 year

## 2022-02-19 ENCOUNTER — HEALTH MAINTENANCE LETTER (OUTPATIENT)
Age: 72
End: 2022-02-19

## 2022-03-22 ENCOUNTER — TELEPHONE (OUTPATIENT)
Dept: FAMILY MEDICINE | Facility: CLINIC | Age: 72
End: 2022-03-22
Payer: MEDICARE

## 2022-03-22 NOTE — TELEPHONE ENCOUNTER
Patient calling requesting to have his Blood Test Lab Results mailed to him from appointment on 1/8/22. Patient reports he never received a letter for these labs like he has gotten from previous labs. Can you pend a letter for us YUAN's to mail patient his results?    Mail Results to: [id number] [address] [place], [place] [address] [address]    Thank You!  Susan Mccabe

## 2022-03-22 NOTE — LETTER
[facility]  [address] [facility] [id number] [phone]          March 23, 2022    RE:  Miko Gomez                                                                                                                                                       [address] [address] [place] [id number]            To whom it may concern:    Miko Gomez is under my professional care. Attached is the lab work you requested.    Sincerely,        Virgil Ledbetter MD

## 2022-03-23 NOTE — TELEPHONE ENCOUNTER
Mailed lab information with letter from Dr. Ledbetter to patient to address [address] [address] [place] [address], [place]  [address].    Cindy León     Mary Moseley

## 2022-04-06 ENCOUNTER — OFFICE VISIT (OUTPATIENT)
Dept: URGENT CARE | Facility: URGENT CARE | Age: 72
End: 2022-04-06
Payer: MEDICARE

## 2022-04-06 VITALS
OXYGEN SATURATION: 98 % | RESPIRATION RATE: 18 BRPM | HEART RATE: 103 BPM | DIASTOLIC BLOOD PRESSURE: 118 MMHG | SYSTOLIC BLOOD PRESSURE: 196 MMHG | TEMPERATURE: 99.4 F

## 2022-04-06 DIAGNOSIS — R00.0 TACHYCARDIA: ICD-10-CM

## 2022-04-06 DIAGNOSIS — R06.02 SHORTNESS OF BREATH: ICD-10-CM

## 2022-04-06 DIAGNOSIS — I10 SEVERE HYPERTENSION: Primary | ICD-10-CM

## 2022-04-06 DIAGNOSIS — Z72.0 TOBACCO ABUSE: ICD-10-CM

## 2022-04-06 DIAGNOSIS — K13.79 MOUTH PAIN: ICD-10-CM

## 2022-04-06 PROCEDURE — 99214 OFFICE O/P EST MOD 30 MIN: CPT | Performed by: NURSE PRACTITIONER

## 2022-04-06 NOTE — PATIENT INSTRUCTIONS
Go to emergency room for further evaluation of severe HTN, mouth pain, tachycardia, shortness of breath

## 2022-04-06 NOTE — PROGRESS NOTES
Assessment & Plan     Severe hypertension    Mouth pain    Tachycardia    Shortness of breath    Tobacco abuse       With severe HTN, tachycardia, shortness of breath with mouth pain and tobacco abuse recommend further evaluation in emergency room to rule out sepsis/PE. Patient declines and would like an antibiotic. Discussed potentially life threatening disease processes and discussed emergency room would be able to treat his mouth as well. Patient agreeable and declines ambulance. He is discharged in stable condition.        Marichuy Islas NP  [facility]      Lissett Crouch is a 71 year old male who presents to clinic today for the following health issues:  Chief Complaint   Patient presents with     Mouth Problem     Infection x1 day     Patient presents for evaluation of mouth infection for 1 day. Associated symptoms: oral pain, shortness of breath started today, swelling of mouth, face. Mouth pain is mild. Denies fever, chills. Nothing tried for symptoms. He smokes 1 ppd. He doesn't go to the dentist due to having pain after a visit previously. Denies chest pain, blurred vision, trouble swallowing. He has been having trouble opening his mouth. He has had this previously and would like an antibiotic.       Problem list, Medication list, Allergies, and Medical history reviewed in EPIC.    ROS:  Review of systems negative except for noted above        Objective    BP (!) 196/118   Pulse 103   Temp 99.4  F (37.4  C) (Tympanic)   Resp 18   SpO2 98%   Physical Exam  Constitutional:       General: He is not in acute distress.     Appearance: He is not toxic-appearing or diaphoretic.   HENT:      Head:      Comments: Swelling left side of jaw     Mouth/Throat:      Mouth: Mucous membranes are moist.      Dentition: Abnormal dentition. Dental tenderness and dental caries present. No dental abscesses.      Tonsils: No tonsillar exudate.      Comments: No dental abscess noted, poor  dentition with few teeth  Cardiovascular:      Rate and Rhythm: Regular rhythm. Tachycardia present.      Pulses: Normal pulses.      Heart sounds: Normal heart sounds.   Pulmonary:      Effort: Pulmonary effort is normal. No respiratory distress.      Breath sounds: Normal breath sounds. No wheezing, rhonchi or rales.   Skin:     General: Skin is warm and dry.   Neurological:      Mental Status: He is alert.   Psychiatric:         Behavior: Behavior is agitated.

## 2022-06-07 ENCOUNTER — PATIENT OUTREACH (OUTPATIENT)
Dept: FAMILY MEDICINE | Facility: CLINIC | Age: 72
End: 2022-06-07
Payer: MEDICARE

## 2022-06-07 NOTE — TELEPHONE ENCOUNTER
Patient Quality Outreach    Patient is due for the following:   Hypertension -  Hypertension follow-up visit    NEXT STEPS:   Schedule a office visit for hypertension with Dr. Ledbetter    Type of outreach:    Sent [employer] message.      Questions for provider review:    None     Rocio Quiñonez CMA

## 2022-10-06 ENCOUNTER — ALLIED HEALTH/NURSE VISIT (OUTPATIENT)
Dept: FAMILY MEDICINE | Facility: CLINIC | Age: 72
End: 2022-10-06
Payer: MEDICARE

## 2022-10-06 VITALS — HEART RATE: 58 BPM | SYSTOLIC BLOOD PRESSURE: 176 MMHG | DIASTOLIC BLOOD PRESSURE: 99 MMHG

## 2022-10-06 DIAGNOSIS — Z01.30 BLOOD PRESSURE CHECK: Primary | ICD-10-CM

## 2022-10-06 PROCEDURE — 99207 PR DROP WITH A PROCEDURE: CPT | Mod: 25 | Performed by: FAMILY MEDICINE

## 2022-10-16 ENCOUNTER — HEALTH MAINTENANCE LETTER (OUTPATIENT)
Age: 72
End: 2022-10-16

## 2022-11-03 ENCOUNTER — TELEPHONE (OUTPATIENT)
Dept: FAMILY MEDICINE | Facility: CLINIC | Age: 72
End: 2022-11-03

## 2022-11-03 NOTE — TELEPHONE ENCOUNTER
Patient Quality Outreach    Patient is due for the following:   Physical Annual Wellness Visit      Topic Date Due     Hepatitis B Vaccine (1 of 3 - 3-dose series) Never done     COVID-19 Vaccine (3 - Booster for Azul series) 03/02/2022     Flu Vaccine (1) 09/01/2022       Next Steps:   Schedule a Annual Wellness Visit    Type of outreach:    Sent [employer] message.      Questions for provider review:    None     CARIN CARRANZA MA

## 2023-04-01 ENCOUNTER — HEALTH MAINTENANCE LETTER (OUTPATIENT)
Age: 73
End: 2023-04-01

## 2023-04-18 ENCOUNTER — LAB (OUTPATIENT)
Dept: LAB | Facility: CLINIC | Age: 73
End: 2023-04-18
Payer: MEDICARE

## 2023-04-18 DIAGNOSIS — E78.00 HIGH CHOLESTEROL: ICD-10-CM

## 2023-04-18 DIAGNOSIS — I10 ESSENTIAL HYPERTENSION WITH GOAL BLOOD PRESSURE LESS THAN 140/90: ICD-10-CM

## 2023-04-18 LAB
ALBUMIN SERPL-MCNC: 3.6 G/DL (ref 3.4–5)
ALP SERPL-CCNC: 68 U/L (ref 40–150)
ALT SERPL W P-5'-P-CCNC: 68 U/L (ref 0–70)
ANION GAP SERPL CALCULATED.3IONS-SCNC: 6 MMOL/L (ref 3–14)
AST SERPL W P-5'-P-CCNC: 63 U/L (ref 0–45)
BILIRUB SERPL-MCNC: 0.9 MG/DL (ref 0.2–1.3)
BUN SERPL-MCNC: 5 MG/DL (ref 7–30)
CALCIUM SERPL-MCNC: 9 MG/DL (ref 8.5–10.1)
CHLORIDE BLD-SCNC: 106 MMOL/L (ref 94–109)
CHOLEST SERPL-MCNC: 157 MG/DL
CO2 SERPL-SCNC: 28 MMOL/L (ref 20–32)
CREAT SERPL-MCNC: 0.77 MG/DL (ref 0.66–1.25)
FASTING STATUS PATIENT QL REPORTED: YES
GFR SERPL CREATININE-BSD FRML MDRD: >90 ML/MIN/1.73M2
GLUCOSE BLD-MCNC: 89 MG/DL (ref 70–99)
HDLC SERPL-MCNC: 75 MG/DL
LDLC SERPL CALC-MCNC: 61 MG/DL
NONHDLC SERPL-MCNC: 82 MG/DL
POTASSIUM BLD-SCNC: 3.6 MMOL/L (ref 3.4–5.3)
PROT SERPL-MCNC: 7.4 G/DL (ref 6.8–8.8)
SODIUM SERPL-SCNC: 140 MMOL/L (ref 133–144)
TRIGL SERPL-MCNC: 104 MG/DL

## 2023-04-18 PROCEDURE — 80053 COMPREHEN METABOLIC PANEL: CPT

## 2023-04-18 PROCEDURE — 36415 COLL VENOUS BLD VENIPUNCTURE: CPT

## 2023-04-18 PROCEDURE — 80061 LIPID PANEL: CPT

## 2023-04-18 RX ORDER — ATENOLOL 100 MG/1
100 TABLET ORAL DAILY
Qty: 30 TABLET | Refills: 0 | Status: SHIPPED | OUTPATIENT
Start: 2023-04-18 | End: 2023-04-24

## 2023-04-26 ENCOUNTER — OFFICE VISIT (OUTPATIENT)
Dept: FAMILY MEDICINE | Facility: CLINIC | Age: 73
End: 2023-04-26
Payer: MEDICARE

## 2023-04-26 VITALS
RESPIRATION RATE: 18 BRPM | WEIGHT: 157.8 LBS | SYSTOLIC BLOOD PRESSURE: 142 MMHG | BODY MASS INDEX: 24.77 KG/M2 | DIASTOLIC BLOOD PRESSURE: 100 MMHG | HEIGHT: 67 IN | OXYGEN SATURATION: 94 % | HEART RATE: 56 BPM | TEMPERATURE: 98.3 F

## 2023-04-26 DIAGNOSIS — E78.00 HIGH CHOLESTEROL: ICD-10-CM

## 2023-04-26 DIAGNOSIS — F17.210 SMOKING GREATER THAN 20 PACK YEARS: Primary | ICD-10-CM

## 2023-04-26 DIAGNOSIS — I10 ESSENTIAL HYPERTENSION WITH GOAL BLOOD PRESSURE LESS THAN 140/90: ICD-10-CM

## 2023-04-26 PROCEDURE — 99214 OFFICE O/P EST MOD 30 MIN: CPT | Mod: 25 | Performed by: FAMILY MEDICINE

## 2023-04-26 PROCEDURE — G0438 PPPS, INITIAL VISIT: HCPCS | Performed by: FAMILY MEDICINE

## 2023-04-26 RX ORDER — LISINOPRIL 20 MG/1
20 TABLET ORAL DAILY
Qty: 90 TABLET | Refills: 3 | Status: SHIPPED | OUTPATIENT
Start: 2023-04-26 | End: 2023-07-12

## 2023-04-26 RX ORDER — AMLODIPINE BESYLATE 5 MG/1
5 TABLET ORAL DAILY
Qty: 30 TABLET | Refills: 0 | Status: SHIPPED | OUTPATIENT
Start: 2023-04-26 | End: 2023-05-17

## 2023-04-26 RX ORDER — ATORVASTATIN CALCIUM 20 MG/1
20 TABLET, FILM COATED ORAL DAILY
Qty: 90 TABLET | Refills: 3 | Status: SHIPPED | OUTPATIENT
Start: 2023-04-26 | End: 2023-07-12

## 2023-04-26 RX ORDER — ATENOLOL 100 MG/1
100 TABLET ORAL DAILY
Qty: 90 TABLET | Refills: 3 | Status: SHIPPED | OUTPATIENT
Start: 2023-04-26 | End: 2024-06-09

## 2023-04-26 ASSESSMENT — ENCOUNTER SYMPTOMS
HEARTBURN: 0
HEMATOCHEZIA: 0
DIARRHEA: 0
EYE PAIN: 0
CHILLS: 0
NAUSEA: 0
HEMATURIA: 0
NERVOUS/ANXIOUS: 0
DYSURIA: 0
WEAKNESS: 0
HEADACHES: 0
PARESTHESIAS: 1
JOINT SWELLING: 0
DIZZINESS: 1
ARTHRALGIAS: 0
FREQUENCY: 0
ABDOMINAL PAIN: 0
FEVER: 0
MYALGIAS: 0
COUGH: 1
PALPITATIONS: 0
SHORTNESS OF BREATH: 1
SORE THROAT: 0
CONSTIPATION: 0

## 2023-04-26 ASSESSMENT — ACTIVITIES OF DAILY LIVING (ADL): CURRENT_FUNCTION: NO ASSISTANCE NEEDED

## 2023-04-26 ASSESSMENT — PAIN SCALES - GENERAL: PAINLEVEL: NO PAIN (0)

## 2023-04-26 NOTE — PROGRESS NOTES
"Lissett Jones is a 72 year old, presenting for the following health issues:  Recheck Medication        4/26/2023    10:14 AM   Additional Questions   Roomed by Meredith LITTLE   Accompanied by self   Annual Wellness Visit    Patient has been advised of split billing requirements and indicates understanding: Yes     Are you in the first 12 months of your Medicare Part B coverage?  No  Healthy Habits:     In general, how would you rate your overall health?  Good    Frequency of exercise:  None    Do you usually eat at least 4 servings of fruit and vegetables a day, include whole grains    & fiber and avoid regularly eating high fat or \"junk\" foods?  No    Taking medications regularly:  Yes    Medication side effects:  Not applicable    Ability to successfully perform activities of daily living:  No assistance needed    Home Safety:  No safety concerns identified    Hearing Impairment:  No hearing concerns    In the past 6 months, have you been bothered by leaking of urine?  No    In general, how would you rate your overall mental or emotional health?  Good      PHQ-2 Total Score: 1    Additional concerns today:  No    Do you feel safe in your environment? Yes    Have you ever done Advance Care Planning? (For example, a Health Directive, POLST, or a discussion with a medical provider or your loved ones about your wishes)? No, advance care planning information given to patient to review.  Patient plans to discuss their wishes with loved ones or provider.      Fall risk:  Fallen 2 or more times in the past year?: No  Any fall with injury in the past year?: No    Cognitive Screening: normal    Do you have sleep apnea, excessive snoring or daytime drowsiness?: yes    Social History     Tobacco Use     Smoking status: Every Day     Packs/day: 1.00     Years: 40.00     Pack years: 40.00     Types: Cigarettes     Smokeless tobacco: Never   Vaping Use     Vaping status: Never Used   Substance Use Topics     Alcohol use: Yes "     Comment: CHERYL EVERYDAY             4/26/2023     9:46 AM   Alcohol Use   Prescreen: >3 drinks/day or >7 drinks/week? Yes   AUDIT SCORE  10     Do you have a current opioid prescription? No  Do you use any other controlled substances or medications that are not prescribed by a provider? Alcohol      Current providers sharing in care for this patient include:   Patient Care Team:  Virgil Ledbetter MD as PCP - General  Virgil Ledbetter MD as Assigned PCP    Patient has been advised of split billing requirements and indicates understanding: Yes    I have reviewed Opioid Use Disorder and Substance Use Disorder risk factors and made any needed referrals.     SUBJECTIVE:  72 year oldyear old male enters with  hypertension.  Pt. Has been compliant with medications and medications were reviewed.  No side effects. No chest pain or sob. Low sodium diet.    Current Outpatient Medications:      amLODIPine (NORVASC) 5 MG tablet, Take 1 tablet (5 mg) by mouth daily, Disp: 30 tablet, Rfl: 0     atenolol (TENORMIN) 100 MG tablet, Take 1 tablet (100 mg) by mouth daily, Disp: 90 tablet, Rfl: 3     atorvastatin (LIPITOR) 20 MG tablet, Take 1 tablet (20 mg) by mouth daily, Disp: 90 tablet, Rfl: 3     EPINEPHrine (ANY BX GENERIC EQUIV) 0.3 MG/0.3ML injection 2-pack, INJECT ONE DEVICE INTO THE MUSCLE AS NEEDED FOR ALLERGIC REACTION, Disp: 2 each, Rfl: 1     lisinopril (ZESTRIL) 20 MG tablet, Take 1 tablet (20 mg) by mouth daily, Disp: 90 tablet, Rfl: 3     Multiple Vitamins-Minerals (CENTRUM SILVER) CHEW, Take  by mouth., Disp: , Rfl:   Past Medical History:   Diagnosis Date     HTN      Lab on 04/18/2023   Component Date Value Ref Range Status     Sodium 04/18/2023 140  133 - 144 mmol/L Final     Potassium 04/18/2023 3.6  3.4 - 5.3 mmol/L Final     Chloride 04/18/2023 106  94 - 109 mmol/L Final     Carbon Dioxide (CO2) 04/18/2023 28  20 - 32 mmol/L Final     Anion Gap 04/18/2023 6  3 - 14 mmol/L Final     Urea Nitrogen  "04/18/2023 5 (L)  7 - 30 mg/dL Final     Creatinine 04/18/2023 0.77  0.66 - 1.25 mg/dL Final     Calcium 04/18/2023 9.0  8.5 - 10.1 mg/dL Final     Glucose 04/18/2023 89  70 - 99 mg/dL Final     Alkaline Phosphatase 04/18/2023 68  40 - 150 U/L Final     AST 04/18/2023 63 (H)  0 - 45 U/L Final     ALT 04/18/2023 68  0 - 70 U/L Final     Protein Total 04/18/2023 7.4  6.8 - 8.8 g/dL Final     Albumin 04/18/2023 3.6  3.4 - 5.0 g/dL Final     Bilirubin Total 04/18/2023 0.9  0.2 - 1.3 mg/dL Final     GFR Estimate 04/18/2023 >90  >60 mL/min/1.73m2 Final    eGFR calculated using 2021 CKD-EPI equation.     Cholesterol 04/18/2023 157  <200 mg/dL Final     Triglycerides 04/18/2023 104  <150 mg/dL Final     Direct Measure HDL 04/18/2023 75  >=40 mg/dL Final     LDL Cholesterol Calculated 04/18/2023 61  <=100 mg/dL Final     Non HDL Cholesterol 04/18/2023 82  <130 mg/dL Final     Patient Fasting > 8hrs? 04/18/2023 Yes   Final      Reviewed health maintenance  Patient Active Problem List   Diagnosis     CARDIOVASCULAR SCREENING; LDL GOAL LESS THAN 130     Hypertension goal BP (blood pressure) < 140/90     Advanced directives, counseling/discussion     High cholesterol         OBJECTIVE:  no apparent distress  BP (!) 142/100   Pulse 56   Temp 98.3  F (36.8  C) (Tympanic)   Resp 18   Ht 1.702 m (5' 7\")   Wt 71.6 kg (157 lb 12.8 oz)   SpO2 94%   BMI 24.71 kg/m       Head: Normocephalic. No masses, lesions, tenderness or abnormalities.  Neck::Neck supple. No adenopathy. Thyroid symmetric, normal size.    Cardiovascular: negative. No lifts, heaves, or thrills. RRR. No murmurs, clicks gallops or rubs  Respiratory. Good diaphragmatic excursion. Lungs clear  Gastrointestinal:Abdomen soft, non-tender.  No masses, organomegaly    Lab on 04/18/2023   Component Date Value Ref Range Status     Sodium 04/18/2023 140  133 - 144 mmol/L Final     Potassium 04/18/2023 3.6  3.4 - 5.3 mmol/L Final     Chloride 04/18/2023 106  94 - 109 mmol/L " Final     Carbon Dioxide (CO2) 04/18/2023 28  20 - 32 mmol/L Final     Anion Gap 04/18/2023 6  3 - 14 mmol/L Final     Urea Nitrogen 04/18/2023 5 (L)  7 - 30 mg/dL Final     Creatinine 04/18/2023 0.77  0.66 - 1.25 mg/dL Final     Calcium 04/18/2023 9.0  8.5 - 10.1 mg/dL Final     Glucose 04/18/2023 89  70 - 99 mg/dL Final     Alkaline Phosphatase 04/18/2023 68  40 - 150 U/L Final     AST 04/18/2023 63 (H)  0 - 45 U/L Final     ALT 04/18/2023 68  0 - 70 U/L Final     Protein Total 04/18/2023 7.4  6.8 - 8.8 g/dL Final     Albumin 04/18/2023 3.6  3.4 - 5.0 g/dL Final     Bilirubin Total 04/18/2023 0.9  0.2 - 1.3 mg/dL Final     GFR Estimate 04/18/2023 >90  >60 mL/min/1.73m2 Final    eGFR calculated using 2021 CKD-EPI equation.     Cholesterol 04/18/2023 157  <200 mg/dL Final     Triglycerides 04/18/2023 104  <150 mg/dL Final     Direct Measure HDL 04/18/2023 75  >=40 mg/dL Final     LDL Cholesterol Calculated 04/18/2023 61  <=100 mg/dL Final     Non HDL Cholesterol 04/18/2023 82  <130 mg/dL Final     Patient Fasting > 8hrs? 04/18/2023 Yes   Final             Review of Systems   Constitutional: Negative for chills and fever.   HENT: Negative for congestion, ear pain, hearing loss and sore throat.    Eyes: Negative for pain and visual disturbance.   Respiratory: Positive for cough and shortness of breath.    Cardiovascular: Negative for chest pain, palpitations and peripheral edema.   Gastrointestinal: Negative for abdominal pain, constipation, diarrhea, heartburn, hematochezia and nausea.   Genitourinary: Positive for impotence. Negative for dysuria, frequency, genital sores, hematuria, penile discharge and urgency.   Musculoskeletal: Negative for arthralgias, joint swelling and myalgias.   Skin: Negative for rash.   Neurological: Positive for dizziness and paresthesias. Negative for weakness and headaches.   Psychiatric/Behavioral: Negative for mood changes. The patient is not nervous/anxious.             Objective   "  BP (!) 142/100   Pulse 56   Temp 98.3  F (36.8  C) (Tympanic)   Resp 18   Ht 1.702 m (5' 7\")   Wt 71.6 kg (157 lb 12.8 oz)   SpO2 94%   BMI 24.71 kg/m    Body mass index is 24.71 kg/m .  Physical Exam   GENERAL: healthy, alert and no distress  EYES: Eyes grossly normal to inspection, PERRL and conjunctivae and sclerae normal  HENT: ear canals and TM's normal, nose and mouth without ulcers or lesions  NECK: no adenopathy, no asymmetry, masses, or scars and thyroid normal to palpation  RESP: lungs clear to auscultation - no rales, rhonchi or wheezes  CV: regular rate and rhythm, normal S1 S2, no S3 or S4, no murmur, click or rub, no peripheral edema and peripheral pulses strong  ABDOMEN: soft, nontender, no hepatosplenomegaly, no masses and bowel sounds normal  MS: no gross musculoskeletal defects noted, no edema  SKIN: no suspicious lesions or rashes  NEURO: Normal strength and tone, mentation intact and speech normal  PSYCH: mentation appears normal, affect normal/bright      ICD-10-CM    1. Smoking greater than 20 pack years  F17.210 CT Chest Lung Cancer Screen Low Dose Without     OFFICE/OUTPT VISIT,EST,LEVL IV      2. Essential hypertension with goal blood pressure less than 140/90  I10 atenolol (TENORMIN) 100 MG tablet     lisinopril (ZESTRIL) 20 MG tablet     amLODIPine (NORVASC) 5 MG tablet     OFFICE/OUTPT VISIT,EST,LEVL IV      3. High cholesterol  E78.00 atorvastatin (LIPITOR) 20 MG tablet     OFFICE/OUTPT VISIT,EST,LEVL IV       PLAN:Recheck one month    Patient will cut back on drinking  Trial of amlodipine            Answers for HPI/ROS submitted by the patient on 4/26/2023  In general, how would you rate your overall physical health?: good  Frequency of exercise:: None  Do you usually eat at least 4 servings of fruit and vegetables a day, include whole grains & fiber, and avoid regularly eating high fat or \"junk\" foods? : No  Taking medications regularly:: Yes  Medication side effects:: Not " applicable  Activities of Daily Living: no assistance needed  Home safety: no safety concerns identified  Hearing Impairment:: no hearing concerns  In the past 6 months, have you been bothered by leaking of urine?: No  In general, how would you rate your overall mental or emotional health?: good  Additional concerns today:: No

## 2023-05-01 ENCOUNTER — TELEPHONE (OUTPATIENT)
Dept: FAMILY MEDICINE | Facility: CLINIC | Age: 73
End: 2023-05-01

## 2023-05-01 ENCOUNTER — ANCILLARY PROCEDURE (OUTPATIENT)
Dept: CT IMAGING | Facility: CLINIC | Age: 73
End: 2023-05-01
Attending: FAMILY MEDICINE
Payer: MEDICARE

## 2023-05-01 DIAGNOSIS — F17.210 SMOKING GREATER THAN 20 PACK YEARS: ICD-10-CM

## 2023-05-01 DIAGNOSIS — I71.21 ANEURYSM OF ASCENDING AORTA WITHOUT RUPTURE (H): Primary | ICD-10-CM

## 2023-05-01 PROCEDURE — 71271 CT THORAX LUNG CANCER SCR C-: CPT | Mod: TC | Performed by: RADIOLOGY

## 2023-05-01 NOTE — TELEPHONE ENCOUNTER
Cindy from [facility] calling to report an incidental finding in 5/1/23 CT Chest Lung. Cindy reports report is ready for provider review.           Routing to provider for further review.     Ingris Torres RN    [facility]

## 2023-05-02 NOTE — TELEPHONE ENCOUNTER
I am sending a referral for Cardiology.  He will be called by the [facility] to make an appointment for him.  They will determine follow up of the scan

## 2023-05-02 NOTE — TELEPHONE ENCOUNTER
Routing to provider, Cardiology referral still pending in encounter. Provider to review.  Any other information to relay to patient regarding this diagnosis? Appears to be a new finding.  Thank you.      Corinna Nunez RN  Clinical Triage/Primary Care  [facility]

## 2023-05-17 DIAGNOSIS — I10 ESSENTIAL HYPERTENSION WITH GOAL BLOOD PRESSURE LESS THAN 140/90: ICD-10-CM

## 2023-05-17 RX ORDER — AMLODIPINE BESYLATE 5 MG/1
5 TABLET ORAL DAILY
Qty: 90 TABLET | Refills: 3 | Status: SHIPPED | OUTPATIENT
Start: 2023-05-17

## 2023-05-18 DIAGNOSIS — T63.441A BEE STING REACTION, ACCIDENTAL OR UNINTENTIONAL, INITIAL ENCOUNTER: ICD-10-CM

## 2023-05-19 RX ORDER — EPINEPHRINE 0.3 MG/.3ML
INJECTION SUBCUTANEOUS
Qty: 2 EACH | Refills: 1 | Status: SHIPPED | OUTPATIENT
Start: 2023-05-19

## 2023-07-12 ENCOUNTER — OFFICE VISIT (OUTPATIENT)
Dept: CARDIOLOGY | Facility: CLINIC | Age: 73
End: 2023-07-12
Payer: MEDICARE

## 2023-07-12 VITALS
OXYGEN SATURATION: 97 % | DIASTOLIC BLOOD PRESSURE: 93 MMHG | BODY MASS INDEX: 23.81 KG/M2 | SYSTOLIC BLOOD PRESSURE: 146 MMHG | WEIGHT: 152 LBS | HEART RATE: 44 BPM

## 2023-07-12 DIAGNOSIS — I10 ESSENTIAL HYPERTENSION WITH GOAL BLOOD PRESSURE LESS THAN 140/90: ICD-10-CM

## 2023-07-12 DIAGNOSIS — I10 HYPERTENSION GOAL BP (BLOOD PRESSURE) < 140/90: Primary | ICD-10-CM

## 2023-07-12 DIAGNOSIS — Z71.6 ENCOUNTER FOR SMOKING CESSATION COUNSELING: ICD-10-CM

## 2023-07-12 DIAGNOSIS — I25.10 CORONARY ARTERY DISEASE INVOLVING NATIVE CORONARY ARTERY OF NATIVE HEART WITHOUT ANGINA PECTORIS: ICD-10-CM

## 2023-07-12 DIAGNOSIS — Z71.6 ENCOUNTER FOR SMOKING CESSATION COUNSELING: Primary | ICD-10-CM

## 2023-07-12 DIAGNOSIS — R06.09 DYSPNEA ON EXERTION: ICD-10-CM

## 2023-07-12 DIAGNOSIS — E78.00 HIGH CHOLESTEROL: ICD-10-CM

## 2023-07-12 DIAGNOSIS — I71.21 ANEURYSM OF ASCENDING AORTA WITHOUT RUPTURE (H): ICD-10-CM

## 2023-07-12 LAB — POTASSIUM SERPL-SCNC: 5.2 MMOL/L (ref 3.4–5.3)

## 2023-07-12 PROCEDURE — 84132 ASSAY OF SERUM POTASSIUM: CPT | Performed by: INTERNAL MEDICINE

## 2023-07-12 PROCEDURE — 84244 ASSAY OF RENIN: CPT | Mod: 90 | Performed by: INTERNAL MEDICINE

## 2023-07-12 PROCEDURE — 82088 ASSAY OF ALDOSTERONE: CPT | Performed by: INTERNAL MEDICINE

## 2023-07-12 PROCEDURE — 99000 SPECIMEN HANDLING OFFICE-LAB: CPT | Performed by: INTERNAL MEDICINE

## 2023-07-12 PROCEDURE — 36415 COLL VENOUS BLD VENIPUNCTURE: CPT | Performed by: INTERNAL MEDICINE

## 2023-07-12 PROCEDURE — 99204 OFFICE O/P NEW MOD 45 MIN: CPT | Performed by: INTERNAL MEDICINE

## 2023-07-12 RX ORDER — ATORVASTATIN CALCIUM 40 MG/1
40 TABLET, FILM COATED ORAL DAILY
Qty: 90 TABLET | Refills: 3 | Status: SHIPPED | OUTPATIENT
Start: 2023-07-12 | End: 2024-07-31

## 2023-07-12 RX ORDER — LISINOPRIL 40 MG/1
40 TABLET ORAL DAILY
Qty: 90 TABLET | Refills: 3 | Status: SHIPPED | OUTPATIENT
Start: 2023-07-12 | End: 2024-07-31

## 2023-07-12 RX ORDER — ASPIRIN 81 MG/1
81 TABLET ORAL DAILY
Qty: 90 TABLET | Refills: 3 | Status: SHIPPED | OUTPATIENT
Start: 2023-07-12

## 2023-07-12 NOTE — NURSING NOTE
"Chief Complaint   Patient presents with     New Patient     Aneurysm of ascending aorta     Heart Problem       Initial BP (!) 150/92 (BP Location: Right arm, Patient Position: Chair, Cuff Size: Adult Regular)   Pulse (!) 47   Wt 68.9 kg (152 lb)   SpO2 97%   BMI 23.81 kg/m   Estimated body mass index is 23.81 kg/m  as calculated from the following:    Height as of 4/26/23: 1.702 m (5' 7\").    Weight as of this encounter: 68.9 kg (152 lb)..  BP completed using cuff size: regular    ANABEL Solano  "

## 2023-07-12 NOTE — LETTER
2023      Robert Gomez  [address] [address] [place] [id number]        Dear ,    We are writing to inform you of your test results.    Your blood test do not suggest secondary causes of high blood pressure.      Isai Matthews MD    Resulted Orders   Potassium   Result Value Ref Range    Potassium 5.2 3.4 - 5.3 mmol/L   Aldosterone   Result Value Ref Range    Aldosterone 5.8 0.0 - 31.0 ng/dL   Renin activity   Result Value Ref Range    Renin Activity 1.3 ng/mL/hr      Comment:      INTERPRETIVE INFORMATION: Renin Activity    Adult, Normal sodium diet:    Supine ................. 0.2-1.6 ng/mL/hr    Upright ................ 0.5-4.0 ng/mL/hr    Children, Normal sodium diet, Supine:     (1-7 days) ..... 2.0-35.0 ng/mL/hr    Cord blood ............. 4.0-32.0 ng/mL/hr    1-12 mos ............... 2.4-37.0 ng/mL/hr    13 mos-3 yrs ........... 1.7-11.2 ng/mL/hr    4-5 yrs ................ 1.0- 6.5 ng/mL/hr    6-10 yrs ............... 0.5- 5.9 ng/mL/hr    11-15 yrs .............. 0.5- 3.3 ng/mL/hr    Children, normal sodium diet, Upright:    0-3 yrs ................ Not Available    4-5 yrs ................ Less than or equal to 15 ng/mL/hr    6-10 yrs ............... Less than or equal to 17 ng/mL/hr    11-15 yrs .............. Less than or equal to 16 ng/mL/hr    Plasma renin activity measures enzyme ability to convert   angiotensinogen to angiotensin I and is limited by the   availability of angiotensinogen. Plasma renin activity is   not an accurate indicator  of enzyme activity when   angiotensinogen is decreased.    This test was developed and its performance characteristics   determined by [facility]. It has not been cleared or   approved by the US Food and Drug Administration. This test   was performed in a CLIA certified laboratory and is   intended for clinical purposes.  Performed By: [facility]  [address] [place] [id number]  : Jey  GENE Oconnell MD, PhD   Aldosterone Renin Ratio   Result Value Ref Range    Aldosterone Renin Ratio 4.5 0.0 - 25.0       If you have any questions or concerns, please call the clinic at the number listed above.       Sincerely,      Isai Matthews MD

## 2023-07-12 NOTE — LETTER
7/12/2023      RE: Miko Gomez  [address] [address] [place] [address]       Dear Colleague,    Thank you for the opportunity to participate in the care of your patient, Miko Gomez, at the [facility]. Please see a copy of my visit note below.    I am delighted to see Miko Gomez in consultation.The primary encounter diagnosis was Hypertension goal BP (blood pressure) < 140/90. Diagnoses of Aneurysm of ascending aorta without rupture (H), Coronary artery disease involving native coronary artery of native heart without angina pectoris, High cholesterol, Encounter for smoking cessation counseling, Essential hypertension with goal blood pressure less than 140/90, and Dyspnea on exertion were also pertinent to this visit.   As you know, the patient is a 72 year old  male. He   has a past medical history of Coronary artery disease involving native coronary artery of native heart without angina pectoris (07/12/2023), HTN, Hyperlipidemia, and Shortness of breath..    On this visit, the patient states that he has new onset dyspnea.  The patient denies chest pressure/discomfort, palpitations, near-syncope, syncope, orthopnea, paroxysmal nocturnal dyspnea and lower extermity edema.    The patient's cardiovascular risk factors include smoker, hypertension and high cholesterol.    The following portions of the patient's history were reviewed and updated as appropriate: allergies, current medications, past family history, past medical history, past social history, past surgical history, and the problem list.    PMH: The patient's past medical history includes:    Past Medical History:   Diagnosis Date    Coronary artery disease involving native coronary artery of native heart without angina pectoris 07/12/2023    HTN     Hyperlipidemia     Shortness of breath       Past Surgical History:   Procedure Laterality  Date    ENT SURGERY      tonsils    ZZC ORAL SURGERY PROCEDURE         The patient's medications as of the current encounter are:     Current Outpatient Medications   Medication Sig Dispense Refill    amLODIPine (NORVASC) 5 MG tablet TAKE 1 TABLET (5 MG) BY MOUTH DAILY 90 tablet 3    atenolol (TENORMIN) 100 MG tablet Take 1 tablet (100 mg) by mouth daily 90 tablet 3    atorvastatin (LIPITOR) 40 MG tablet Take 1 tablet (40 mg) by mouth daily 90 tablet 3    EPINEPHrine (ANY BX GENERIC EQUIV) 0.3 MG/0.3ML injection 2-pack INJECT ONE DEVICE INTO THE MUSCLE AS NEEDED FOR ALLERGIC REACTION 2 each 1    lisinopril (ZESTRIL) 40 MG tablet Take 1 tablet (40 mg) by mouth daily 90 tablet 3    Multiple Vitamins-Minerals (CENTRUM SILVER) CHEW Take  by mouth.         Labs:     Lab on 04/18/2023   Component Date Value Ref Range Status    Sodium 04/18/2023 140  133 - 144 mmol/L Final    Potassium 04/18/2023 3.6  3.4 - 5.3 mmol/L Final    Chloride 04/18/2023 106  94 - 109 mmol/L Final    Carbon Dioxide (CO2) 04/18/2023 28  20 - 32 mmol/L Final    Anion Gap 04/18/2023 6  3 - 14 mmol/L Final    Urea Nitrogen 04/18/2023 5 (L)  7 - 30 mg/dL Final    Creatinine 04/18/2023 0.77  0.66 - 1.25 mg/dL Final    Calcium 04/18/2023 9.0  8.5 - 10.1 mg/dL Final    Glucose 04/18/2023 89  70 - 99 mg/dL Final    Alkaline Phosphatase 04/18/2023 68  40 - 150 U/L Final    AST 04/18/2023 63 (H)  0 - 45 U/L Final    ALT 04/18/2023 68  0 - 70 U/L Final    Protein Total 04/18/2023 7.4  6.8 - 8.8 g/dL Final    Albumin 04/18/2023 3.6  3.4 - 5.0 g/dL Final    Bilirubin Total 04/18/2023 0.9  0.2 - 1.3 mg/dL Final    GFR Estimate 04/18/2023 >90  >60 mL/min/1.73m2 Final    eGFR calculated using 2021 CKD-EPI equation.    Cholesterol 04/18/2023 157  <200 mg/dL Final    Triglycerides 04/18/2023 104  <150 mg/dL Final    Direct Measure HDL 04/18/2023 75  >=40 mg/dL Final    LDL Cholesterol Calculated 04/18/2023 61  <=100 mg/dL Final    Non HDL Cholesterol 04/18/2023 82   <130 mg/dL Final    Patient Fasting > 8hrs? 04/18/2023 Yes   Final       Allergies:    Allergies   Allergen Reactions    Bee Venom Anaphylaxis    Hctz [Hydrochlorothiazide] Muscle Pain (Myalgia)       Family History:   Family History   Problem Relation Age of Onset    Cancer Father     Cancer Maternal Grandmother     Cancer Brother         neck age 48    Pancreatic Cancer Brother     Cancer Brother     No Known Problems Sister     No Known Problems Sister     No Known Problems Son     No Known Problems Daughter     Cancer Other        Psychosocial history:  reports that he has been smoking cigarettes. He has a 40.00 pack-year smoking history. He has never used smokeless tobacco. He reports current alcohol use. He reports that he does not use drugs.    Review of systems: negative for, palpitations, exertional chest pain or pressure, paroxysmal nocturnal dyspnea, orthopnea, lower extremity edema and syncope or near-syncope    In addition,   General: No change in weight, sleep or appetite.  Normal energy.  No fever or chills  Eyes: Negative for vision changes or eye problems  ENT: No problems with ears, nose or throat.  No difficulty swallowing.  Resp: No coughing, wheezing or shortness of breath  GI: No nausea, vomiting,  heartburn, abdominal pain, diarrhea, constipation or change in bowel habits  : No urinary frequency or dysuria, bladder or kidney problems  Musculoskeletal: No significant muscle or joint pains  Neurologic: No headaches, numbness, tingling, weakness, problems with balance or coordination  Psychiatric: No problems with anxiety, depression or mental health  Heme/immune/allergy: No history of bleeding or clotting problems or anemia.    Endocrine: No history of thyroid disease, diabetes or other endocrine disorders  Skin: No rashes,worrisome lesions or skin problems  Vascular:  No claudication, lifestyle limiting or otherwise; no ischemic rest pain; no non-healing ulcers. No weakness, No loss of  sensation        Physical examination  Vitals: BP (!) 150/92 (BP Location: Right arm, Patient Position: Chair, Cuff Size: Adult Regular)   Pulse (!) 47   Wt 68.9 kg (152 lb)   SpO2 97%   BMI 23.81 kg/m    BMI= Body mass index is 23.81 kg/m .    In general, the patient is a pleasant male in no apparent distress.    HEENT: Normiocephalic and atraumatic.  PERRLA.  EOMI.  Sclerae white, not injected.  Nares clear.  Pharynx without erythema or exudate.  Dentition intact.    Neck: No adenopathy.  No thyromegaly. Carotids +2/2 bilaterally without bruits.  No jugular venous distension.   Heart:  The PMI is in the 5th ICS in the midclavicular line. There is no heave. Regular rate and rhythm. Normal S1, S2 splits physiologically. No murmur, rub, click, or gallop.    Lungs: Clear to asculation.  No ronchi, wheezes, rales.  No dullness to percussion.   Abdomen: Soft, nontender, nondistended. No organomegaly. No AAA.  No bruits.   Extremities: No clubbing, cyanosis, or edema. The pulses were intact bilaterally.   Neurological: The neurological examination reveal a patient who was oriented to person, place, and time.  The remainder of the examination was nonfocal.    Cardiac tests include:    Chest CT for smoking screening showed 4.7 cm aortic aneurysm and coronary calcium    Assessment and Plan    1. Aortic aneurysm - follow up 6 months  - manage HTN, HL  2. CAD/ dyspnea - add ASA  - stress echo  3. HL - increase statin   4. HTN - increase lisinopril  - bradycardia limits increased beta blocker  - on amlodipine  5. Smoking cessation      The patient is to return  In 6 months. The patient understood the treatment plan as outlined above.  There were no barriers to learning.      Isai Matthews MD

## 2023-07-12 NOTE — PROGRESS NOTES
I am delighted to see Miko Gomez in consultation.The primary encounter diagnosis was Hypertension goal BP (blood pressure) < 140/90. Diagnoses of Aneurysm of ascending aorta without rupture (H), Coronary artery disease involving native coronary artery of native heart without angina pectoris, High cholesterol, Encounter for smoking cessation counseling, Essential hypertension with goal blood pressure less than 140/90, and Dyspnea on exertion were also pertinent to this visit.   As you know, the patient is a 72 year old  male. He   has a past medical history of Coronary artery disease involving native coronary artery of native heart without angina pectoris (07/12/2023), HTN, Hyperlipidemia, and Shortness of breath..    On this visit, the patient states that he has new onset dyspnea.  The patient denies chest pressure/discomfort, palpitations, near-syncope, syncope, orthopnea, paroxysmal nocturnal dyspnea and lower extermity edema.    The patient's cardiovascular risk factors include smoker, hypertension and high cholesterol.    The following portions of the patient's history were reviewed and updated as appropriate: allergies, current medications, past family history, past medical history, past social history, past surgical history, and the problem list.    PMH: The patient's past medical history includes:    Past Medical History:   Diagnosis Date     Coronary artery disease involving native coronary artery of native heart without angina pectoris 07/12/2023     HTN      Hyperlipidemia      Shortness of breath       Past Surgical History:   Procedure Laterality Date     ENT SURGERY      tonsils     ZZC ORAL SURGERY PROCEDURE         The patient's medications as of the current encounter are:     Current Outpatient Medications   Medication Sig Dispense Refill     amLODIPine (NORVASC) 5 MG tablet TAKE 1 TABLET (5 MG) BY MOUTH DAILY 90 tablet 3     atenolol (TENORMIN) 100 MG tablet Take 1 tablet (100 mg)  by mouth daily 90 tablet 3     atorvastatin (LIPITOR) 40 MG tablet Take 1 tablet (40 mg) by mouth daily 90 tablet 3     EPINEPHrine (ANY BX GENERIC EQUIV) 0.3 MG/0.3ML injection 2-pack INJECT ONE DEVICE INTO THE MUSCLE AS NEEDED FOR ALLERGIC REACTION 2 each 1     lisinopril (ZESTRIL) 40 MG tablet Take 1 tablet (40 mg) by mouth daily 90 tablet 3     Multiple Vitamins-Minerals (CENTRUM SILVER) CHEW Take  by mouth.         Labs:     Lab on 04/18/2023   Component Date Value Ref Range Status     Sodium 04/18/2023 140  133 - 144 mmol/L Final     Potassium 04/18/2023 3.6  3.4 - 5.3 mmol/L Final     Chloride 04/18/2023 106  94 - 109 mmol/L Final     Carbon Dioxide (CO2) 04/18/2023 28  20 - 32 mmol/L Final     Anion Gap 04/18/2023 6  3 - 14 mmol/L Final     Urea Nitrogen 04/18/2023 5 (L)  7 - 30 mg/dL Final     Creatinine 04/18/2023 0.77  0.66 - 1.25 mg/dL Final     Calcium 04/18/2023 9.0  8.5 - 10.1 mg/dL Final     Glucose 04/18/2023 89  70 - 99 mg/dL Final     Alkaline Phosphatase 04/18/2023 68  40 - 150 U/L Final     AST 04/18/2023 63 (H)  0 - 45 U/L Final     ALT 04/18/2023 68  0 - 70 U/L Final     Protein Total 04/18/2023 7.4  6.8 - 8.8 g/dL Final     Albumin 04/18/2023 3.6  3.4 - 5.0 g/dL Final     Bilirubin Total 04/18/2023 0.9  0.2 - 1.3 mg/dL Final     GFR Estimate 04/18/2023 >90  >60 mL/min/1.73m2 Final    eGFR calculated using 2021 CKD-EPI equation.     Cholesterol 04/18/2023 157  <200 mg/dL Final     Triglycerides 04/18/2023 104  <150 mg/dL Final     Direct Measure HDL 04/18/2023 75  >=40 mg/dL Final     LDL Cholesterol Calculated 04/18/2023 61  <=100 mg/dL Final     Non HDL Cholesterol 04/18/2023 82  <130 mg/dL Final     Patient Fasting > 8hrs? 04/18/2023 Yes   Final       Allergies:    Allergies   Allergen Reactions     Bee Venom Anaphylaxis     Hctz [Hydrochlorothiazide] Muscle Pain (Myalgia)       Family History:   Family History   Problem Relation Age of Onset     Cancer Father      Cancer Maternal  Grandmother      Cancer Brother         neck age 48     Pancreatic Cancer Brother      Cancer Brother      No Known Problems Sister      No Known Problems Sister      No Known Problems Son      No Known Problems Daughter      Cancer Other        Psychosocial history:  reports that he has been smoking cigarettes. He has a 40.00 pack-year smoking history. He has never used smokeless tobacco. He reports current alcohol use. He reports that he does not use drugs.    Review of systems: negative for, palpitations, exertional chest pain or pressure, paroxysmal nocturnal dyspnea, orthopnea, lower extremity edema and syncope or near-syncope    In addition,   General: No change in weight, sleep or appetite.  Normal energy.  No fever or chills  Eyes: Negative for vision changes or eye problems  ENT: No problems with ears, nose or throat.  No difficulty swallowing.  Resp: No coughing, wheezing or shortness of breath  GI: No nausea, vomiting,  heartburn, abdominal pain, diarrhea, constipation or change in bowel habits  : No urinary frequency or dysuria, bladder or kidney problems  Musculoskeletal: No significant muscle or joint pains  Neurologic: No headaches, numbness, tingling, weakness, problems with balance or coordination  Psychiatric: No problems with anxiety, depression or mental health  Heme/immune/allergy: No history of bleeding or clotting problems or anemia.    Endocrine: No history of thyroid disease, diabetes or other endocrine disorders  Skin: No rashes,worrisome lesions or skin problems  Vascular:  No claudication, lifestyle limiting or otherwise; no ischemic rest pain; no non-healing ulcers. No weakness, No loss of sensation        Physical examination  Vitals: BP (!) 150/92 (BP Location: Right arm, Patient Position: Chair, Cuff Size: Adult Regular)   Pulse (!) 47   Wt 68.9 kg (152 lb)   SpO2 97%   BMI 23.81 kg/m    BMI= Body mass index is 23.81 kg/m .    In general, the patient is a pleasant male in no  apparent distress.    HEENT: Normiocephalic and atraumatic.  PERRLA.  EOMI.  Sclerae white, not injected.  Nares clear.  Pharynx without erythema or exudate.  Dentition intact.    Neck: No adenopathy.  No thyromegaly. Carotids +2/2 bilaterally without bruits.  No jugular venous distension.   Heart:  The PMI is in the 5th ICS in the midclavicular line. There is no heave. Regular rate and rhythm. Normal S1, S2 splits physiologically. No murmur, rub, click, or gallop.    Lungs: Clear to asculation.  No ronchi, wheezes, rales.  No dullness to percussion.   Abdomen: Soft, nontender, nondistended. No organomegaly. No AAA.  No bruits.   Extremities: No clubbing, cyanosis, or edema. The pulses were intact bilaterally.   Neurological: The neurological examination reveal a patient who was oriented to person, place, and time.  The remainder of the examination was nonfocal.    Cardiac tests include:    Chest CT for smoking screening showed 4.7 cm aortic aneurysm and coronary calcium    Assessment and Plan    1. Aortic aneurysm - follow up 6 months  - manage HTN, HL  2. CAD/ dyspnea - add ASA  - stress echo  3. HL - increase statin   4. HTN - increase lisinopril  - bradycardia limits increased beta blocker  - on amlodipine  5. Smoking cessation      The patient is to return  In 6 months. The patient understood the treatment plan as outlined above.  There were no barriers to learning.      Isai Matthews MD

## 2023-07-12 NOTE — PATIENT INSTRUCTIONS
Thank you for coming to the [facility] at [place]; please note the following instructions:    1. Labs today.    2. START: Atorvastatin 40 mg daily    3. START: Lisinopril 40 mg daily    4. Dr. Matthews has ordered a stress echo for you to do now/soon.    5. Follow up with Dr. Matthews in 6 months with an echocardiogram prior.        If you have any questions regarding your visit, please contact your care team:     CARDIOLOGY  TELEPHONE NUMBER   Yuliya MARSH, Registered Nurse  Macey DURHAM, Registered Nurse  Margie ZAYAS, Registered Nurse  Cindy NASCIMENTO, Registered Medical Assistant  Sadie MADDEN, Certified Medical Assistant  Laura LOVE, Visit Facilitator [phone] (select option 1)    *After hours: [phone]   For Scheduling Appts:     [phone] (select option 1)    *After hours: [phone]   For the Device Clinic (Pacemakers and ICD's)  Christi SOLIS, Registered Nurse   During business hours: [phone]    *After business hours:  [phone] (select option 4)      Normal test result notifications will be released via [facility] or mailed within 7 business days.  All other test results, will be communicated via telephone once reviewed by your cardiologist.    If you need a medication refill, please contact your pharmacy.  Please allow 3 business days for your refill to be completed.    As always, thank you for trusting us with your health care needs!

## 2023-07-12 NOTE — LETTER
July 13, 2023      Robert Gomez  [address] [address] [place] [id number]        Dear ,    We are writing to inform you of your test results.    Your potassium is normal.     Isai Matthews MD     Resulted Orders   Potassium   Result Value Ref Range    Potassium 5.2 3.4 - 5.3 mmol/L       If you have any questions or concerns, please call the clinic at the number listed above.       Sincerely,      Isai Matthews MD

## 2023-07-25 ENCOUNTER — ANCILLARY PROCEDURE (OUTPATIENT)
Dept: CARDIOLOGY | Facility: CLINIC | Age: 73
End: 2023-07-25
Attending: INTERNAL MEDICINE
Payer: MEDICARE

## 2023-07-25 DIAGNOSIS — R06.09 DYSPNEA ON EXERTION: ICD-10-CM

## 2023-07-25 PROCEDURE — 93018 CV STRESS TEST I&R ONLY: CPT | Performed by: INTERNAL MEDICINE

## 2023-07-25 PROCEDURE — 93350 STRESS TTE ONLY: CPT | Mod: TC | Performed by: INTERNAL MEDICINE

## 2023-07-25 PROCEDURE — 93016 CV STRESS TEST SUPVJ ONLY: CPT | Performed by: INTERNAL MEDICINE

## 2023-07-25 PROCEDURE — 93352 ADMIN ECG CONTRAST AGENT: CPT | Performed by: INTERNAL MEDICINE

## 2023-07-25 PROCEDURE — 99207 PR STATISTIC IV PUSH SINGLE INITIAL SUBSTANCE: CPT | Performed by: INTERNAL MEDICINE

## 2023-07-25 PROCEDURE — 93321 DOPPLER ECHO F-UP/LMTD STD: CPT | Mod: 26 | Performed by: INTERNAL MEDICINE

## 2023-07-25 PROCEDURE — 93017 CV STRESS TEST TRACING ONLY: CPT | Performed by: INTERNAL MEDICINE

## 2023-07-25 PROCEDURE — 93350 STRESS TTE ONLY: CPT | Mod: 26 | Performed by: INTERNAL MEDICINE

## 2023-07-25 PROCEDURE — 93325 DOPPLER ECHO COLOR FLOW MAPG: CPT | Mod: 26 | Performed by: INTERNAL MEDICINE

## 2023-07-25 PROCEDURE — 93321 DOPPLER ECHO F-UP/LMTD STD: CPT | Mod: TC | Performed by: INTERNAL MEDICINE

## 2023-07-25 PROCEDURE — 93325 DOPPLER ECHO COLOR FLOW MAPG: CPT | Mod: TC | Performed by: INTERNAL MEDICINE

## 2023-07-25 RX ADMIN — Medication 5 ML: at 11:40

## 2023-07-27 LAB — RENIN PLAS-CCNC: 1.3 NG/ML/HR

## 2023-07-31 LAB
ALDOST SERPL-MCNC: 5.8 NG/DL (ref 0–31)
ALDOST/RENIN PLAS-RTO: 4.5 {RATIO} (ref 0–25)

## 2023-08-08 ENCOUNTER — TELEPHONE (OUTPATIENT)
Dept: CARDIOLOGY | Facility: CLINIC | Age: 73
End: 2023-08-08
Payer: MEDICARE

## 2023-08-08 DIAGNOSIS — I25.10 CORONARY ARTERY DISEASE INVOLVING NATIVE CORONARY ARTERY OF NATIVE HEART WITHOUT ANGINA PECTORIS: ICD-10-CM

## 2023-08-08 DIAGNOSIS — R06.09 DYSPNEA ON EXERTION: Primary | ICD-10-CM

## 2023-08-08 DIAGNOSIS — R93.1 SUBOPTIMAL ECHOCARDIOGRAM: ICD-10-CM

## 2023-08-08 NOTE — TELEPHONE ENCOUNTER
Left message for patient to call clinic to discuss the results.  Yuliya Matthews MD  8/1/2023  3:49 PM CDT       Please see My Chart comments to the patient.     Isai Matthews MD  8/1/2023  3:48 PM              Exercise Stress Echocardiogram: Patient Communication     Edit Comments   Add Notifications     Dear Mr. Gomez     Your stress test was not diagnostic because your heart rate did not increase enough.  We can try a different test called a lexiscan, where you do not need to get your heart rate up.     Isai Matthews MD

## 2023-08-08 NOTE — LETTER
"  August 10, 2023      TO: Miko Gomez  [id number] [address] [place] [id number]         Dear iMko,    I have been trying to contact you via phone but have not been successful.  Dr. Matthews has reviewed your stress test.  We need more information since your heart rate did not reach target.  He has ordered a nuclear stress test called Lexiscan.  This can be done at [place] or [facility].  Below is information about the test.  Please call [phone] to schedule.    It was a pleasure to see you at your last clinic visit. Please do not hesitate to call me if you have any questions or concerns.    Sincerely,      Yuliya Knight, RN  Cardiology Care Coordinator  [facility]  [phone] option 1    Information for Lexiscan Test     Test can take up to 3-4 hours.  Nothing to eat 3 hours prior.  Water is permitted.  No caffiene 12 hours prior.  Wear comfortable clothing.     Cardiac nuclear imaging measures the flow of blood in your heart at rest and then during exercise. The images are compared to determine whether there are any blockages in the arteries, changes in blood flow or oxygen supply from resting to the stressed state, areas of scar tissue, or if there has been a prior heart attack. It also measures how well the heart muscle squeezes and pumps. The test is also sometimes called a \"perfusion scan\" or a \"SPECT MPI\" (single photon emission computed tomography myocardial perfusion imaging). For the scan, a small amount of radioactive material called \"tracer\" is delivered into the bloodstream. A special camera then scans the tracer in the blood as it flows through the heart muscle. Areas of the heart that have good blood flow absorb the tracer. Areas that are not getting enough blood will not absorb the tracer. This can be a sign of a blocked artery, vessel narrowing, or any area of the heart not receiving blood, perhaps as a result of damage from a heart attack. The " tracer leaves your body within hours. This test can be done in a hospital or test center.     During your test  Here is what to expect during your test:              You may be asked to change into a hospital gown from the waist up.              You will be attached to EKG, which monitors your heart rhythm, and blood pressure monitors. An IV (intravenous) line will be started in your arm.              At some point, scanning pictures will be taken while you rest. This may be done before you exercise or you may be asked to return for resting scans later that day or the next.              You will exercise on a treadmill or stationary bike for a few minutes. This increases the rate of blood flow to your heart muscle.              Speak up when you feel that you cannot exercise for even 1 more minute. At this point, the tracer is given to you through the IV, as this is considered the point of maximum stress.               If you cannot exercise by using a treadmill or bicycle, special medicines can be used to artificially increase heart rate while you are resting. This is done to put your heart under stress.               After you have received the tracer, you will be positioned on the scanning bed.              You must lie very still for up to 30 minutes. During this time, a scanning camera will be taking pictures of your heart. The images will show where blood flows through your heart muscle.  After your test  Before going home, ask when you may eat. Also, find out when to resume taking any medicines you were told to skip before the test. If you need to return for resting scans, follow any instructions. Most people can go back to their normal routine as soon as all parts of the test are finished. Drink plenty of water to help flush the tracer from your body.  Let the technologist know  Inform the technologist about the following:              What medicines you take              If you have diabetes, knee or hip  problems, arthritis, asthma, or chronic lung disease              Recent chest pain since your last appointment              Inability to participate in exercise               If you have had a stroke or have vascular disease of the leg              If you are pregnant, think you might be, or are nursing  Report any symptoms  Be sure to tell the healthcare provider if you feel any of the following during the test:              Chest, arm, or jaw discomfort              Severe shortness of breath              Dizziness or lightheadedness              Feelings of panic              Inability to participate in exercise               Palpitations              Leg cramps or pain

## 2023-09-06 ENCOUNTER — TELEPHONE (OUTPATIENT)
Dept: FAMILY MEDICINE | Facility: CLINIC | Age: 73
End: 2023-09-06
Payer: MEDICARE

## 2023-09-06 NOTE — TELEPHONE ENCOUNTER
Patient Quality Outreach    Patient is due for the following:   Spirometry  BP office visit recheck      Topic Date Due    Flu Vaccine (1) 09/01/2023       Next Steps:   Schedule a nurse only visit for immunization    Type of outreach:    Sent Anaphore message.      Questions for provider review:    None           CARIN CARRANZA MA

## 2023-09-22 ENCOUNTER — ANCILLARY PROCEDURE (OUTPATIENT)
Dept: NUCLEAR MEDICINE | Facility: CLINIC | Age: 73
End: 2023-09-22
Attending: INTERNAL MEDICINE
Payer: MEDICARE

## 2023-09-22 DIAGNOSIS — R06.09 DYSPNEA ON EXERTION: ICD-10-CM

## 2023-09-22 DIAGNOSIS — R93.1 SUBOPTIMAL ECHOCARDIOGRAM: ICD-10-CM

## 2023-09-22 DIAGNOSIS — I25.10 CORONARY ARTERY DISEASE INVOLVING NATIVE CORONARY ARTERY OF NATIVE HEART WITHOUT ANGINA PECTORIS: ICD-10-CM

## 2023-09-22 DIAGNOSIS — R06.09 DYSPNEA ON EXERTION: Primary | ICD-10-CM

## 2023-09-22 PROCEDURE — 93016 CV STRESS TEST SUPVJ ONLY: CPT | Performed by: INTERNAL MEDICINE

## 2023-09-22 PROCEDURE — 93017 CV STRESS TEST TRACING ONLY: CPT | Performed by: INTERNAL MEDICINE

## 2023-09-22 PROCEDURE — 93018 CV STRESS TEST I&R ONLY: CPT | Performed by: INTERNAL MEDICINE

## 2023-09-22 PROCEDURE — G1010 CDSM STANSON: HCPCS | Performed by: INTERNAL MEDICINE

## 2023-09-22 PROCEDURE — A9502 TC99M TETROFOSMIN: HCPCS | Performed by: INTERNAL MEDICINE

## 2023-09-22 PROCEDURE — 78452 HT MUSCLE IMAGE SPECT MULT: CPT | Mod: ME

## 2023-09-22 RX ORDER — REGADENOSON 0.08 MG/ML
0.4 INJECTION, SOLUTION INTRAVENOUS ONCE
Status: COMPLETED | OUTPATIENT
Start: 2023-09-22 | End: 2023-09-22

## 2023-09-22 RX ADMIN — REGADENOSON 0.4 MG: 0.08 INJECTION, SOLUTION INTRAVENOUS at 13:43

## 2023-09-25 LAB
CV BLOOD PRESSURE: 80 MMHG
CV STRESS MAX HR HE: 79
RATE PRESSURE PRODUCT: NORMAL
STRESS ECHO BASELINE DIASTOLIC HE: 98
STRESS ECHO BASELINE HR: 52 BPM
STRESS ECHO BASELINE SYSTOLIC BP: 145
STRESS ECHO CALCULATED PERCENT HR: 53 %
STRESS ECHO LAST STRESS DIASTOLIC BP: 90
STRESS ECHO LAST STRESS SYSTOLIC BP: 142
STRESS ECHO TARGET HR: 149

## 2024-01-08 ENCOUNTER — ANCILLARY PROCEDURE (OUTPATIENT)
Dept: CARDIOLOGY | Facility: CLINIC | Age: 74
End: 2024-01-08
Attending: INTERNAL MEDICINE
Payer: MEDICARE

## 2024-01-08 DIAGNOSIS — I71.21 ANEURYSM OF ASCENDING AORTA WITHOUT RUPTURE (H): ICD-10-CM

## 2024-01-08 LAB — LVEF ECHO: NORMAL

## 2024-01-08 PROCEDURE — 93306 TTE W/DOPPLER COMPLETE: CPT | Performed by: STUDENT IN AN ORGANIZED HEALTH CARE EDUCATION/TRAINING PROGRAM

## 2024-01-24 ENCOUNTER — OFFICE VISIT (OUTPATIENT)
Dept: CARDIOLOGY | Facility: CLINIC | Age: 74
End: 2024-01-24
Attending: INTERNAL MEDICINE
Payer: MEDICARE

## 2024-01-24 VITALS
SYSTOLIC BLOOD PRESSURE: 184 MMHG | WEIGHT: 155 LBS | BODY MASS INDEX: 24.33 KG/M2 | HEIGHT: 67 IN | HEART RATE: 65 BPM | DIASTOLIC BLOOD PRESSURE: 92 MMHG | OXYGEN SATURATION: 96 %

## 2024-01-24 DIAGNOSIS — R06.09 DYSPNEA ON EXERTION: ICD-10-CM

## 2024-01-24 DIAGNOSIS — E78.00 HIGH CHOLESTEROL: ICD-10-CM

## 2024-01-24 DIAGNOSIS — I10 HYPERTENSION GOAL BP (BLOOD PRESSURE) < 140/90: ICD-10-CM

## 2024-01-24 DIAGNOSIS — Z71.6 ENCOUNTER FOR SMOKING CESSATION COUNSELING: ICD-10-CM

## 2024-01-24 DIAGNOSIS — I25.10 CORONARY ARTERY DISEASE INVOLVING NATIVE CORONARY ARTERY OF NATIVE HEART WITHOUT ANGINA PECTORIS: ICD-10-CM

## 2024-01-24 DIAGNOSIS — I71.21 ANEURYSM OF ASCENDING AORTA WITHOUT RUPTURE (H): ICD-10-CM

## 2024-01-24 PROCEDURE — 99213 OFFICE O/P EST LOW 20 MIN: CPT | Performed by: INTERNAL MEDICINE

## 2024-01-24 NOTE — LETTER
1/24/2024      RE: Miko Gomez  [address] [address] [place] [address]       Dear Colleague,    Thank you for the opportunity to participate in the care of your patient, Miko Gomez, at the [facility]. Please see a copy of my visit note below.    I am delighted to see Miko Gomez in consultation.Diagnoses of Aneurysm of ascending aorta without rupture (H24), Coronary artery disease involving native coronary artery of native heart without angina pectoris, Hypertension goal BP (blood pressure) < 140/90, High cholesterol, Encounter for smoking cessation counseling, and Dyspnea on exertion were pertinent to this visit.   As you know, the patient is a 73 year old  male. He   has a past medical history of Coronary artery disease involving native coronary artery of native heart without angina pectoris (07/12/2023), HTN, Hyperlipidemia, and Shortness of breath..    On this visit, the patient states that he has improved dyspnea.  The patient denies chest pressure/discomfort, dyspnea, palpitations, near-syncope, syncope, orthopnea, paroxysmal nocturnal dyspnea, and lower extermity edema.    The patient's cardiovascular risk factors include hypertension, smoking,  and high cholesterol.    The following portions of the patient's history were reviewed and updated as appropriate: allergies, current medications, past family history, past medical history, past social history, past surgical history, and the problem list.    PMH: The patient's past medical history includes:    Past Medical History:   Diagnosis Date     Coronary artery disease involving native coronary artery of native heart without angina pectoris 07/12/2023     HTN      Hyperlipidemia      Shortness of breath       Past Surgical History:   Procedure Laterality Date     ENT SURGERY      tonsils     ZZC ORAL SURGERY PROCEDURE         The patient's  medications as of the current encounter are:     Current Outpatient Medications   Medication Sig Dispense Refill     amLODIPine (NORVASC) 5 MG tablet TAKE 1 TABLET (5 MG) BY MOUTH DAILY 90 tablet 3     aspirin 81 MG EC tablet Take 1 tablet (81 mg) by mouth daily 90 tablet 3     atenolol (TENORMIN) 100 MG tablet Take 1 tablet (100 mg) by mouth daily 90 tablet 3     atorvastatin (LIPITOR) 40 MG tablet Take 1 tablet (40 mg) by mouth daily 90 tablet 3     EPINEPHrine (ANY BX GENERIC EQUIV) 0.3 MG/0.3ML injection 2-pack INJECT ONE DEVICE INTO THE MUSCLE AS NEEDED FOR ALLERGIC REACTION 2 each 1     lisinopril (ZESTRIL) 40 MG tablet Take 1 tablet (40 mg) by mouth daily 90 tablet 3     Multiple Vitamins-Minerals (CENTRUM SILVER) CHEW Take  by mouth.       nicotine (NICOTROL) 10 MG inhaler Use 1 cartridge as needed for urge to smoke by puffing over course of 20min.  Use 6-16 cart/day; reduce number of cart/day over 6-12 weeks. 168 each 3       Labs:     Ancillary Procedure on 01/08/2024   Component Date Value Ref Range Status     LVEF  01/08/2024 55-60%   Final       Allergies:    Allergies   Allergen Reactions     Bee Venom Anaphylaxis     Hctz [Hydrochlorothiazide] Muscle Pain (Myalgia)       Family History:   Family History   Problem Relation Age of Onset     Cancer Father      Cancer Maternal Grandmother      Cancer Brother         neck age 48     Pancreatic Cancer Brother      Cancer Brother      No Known Problems Sister      No Known Problems Sister      No Known Problems Son      No Known Problems Daughter      Cancer Other        Psychosocial history:  reports that he has been smoking cigarettes. He has a 40 pack-year smoking history. He has never used smokeless tobacco. He reports current alcohol use. He reports that he does not use drugs.    Review of systems: negative for, palpitations, exertional chest pain or pressure, paroxysmal nocturnal dyspnea, dyspnea on exertion, orthopnea, lower extremity edema, and  "syncope or near-syncope    In addition,   General: No change in weight, sleep or appetite.  Normal energy.  No fever or chills  Eyes: Negative for vision changes or eye problems  ENT: No problems with ears, nose or throat.  No difficulty swallowing.  Resp: No coughing, wheezing or shortness of breath  GI: No nausea, vomiting,  heartburn, abdominal pain, diarrhea, constipation or change in bowel habits  : No urinary frequency or dysuria, bladder or kidney problems  Musculoskeletal: No significant muscle or joint pains  Neurologic: No headaches, numbness, tingling, weakness, problems with balance or coordination  Psychiatric: No problems with anxiety, depression or mental health  Heme/immune/allergy: No history of bleeding or clotting problems or anemia.    Endocrine: No history of thyroid disease, diabetes or other endocrine disorders  Skin: No rashes,worrisome lesions or skin problems  Vascular:  No claudication, lifestyle limiting or otherwise; no ischemic rest pain; no non-healing ulcers. No weakness, No loss of sensation        Physical examination  Vitals: BP (!) 184/92   Pulse 65   Ht 1.702 m (5' 7\")   Wt 70.3 kg (155 lb)   SpO2 96%   BMI 24.28 kg/m    BMI= Body mass index is 24.28 kg/m .    In general, the patient is a pleasant male in no apparent distress.    HEENT: Normiocephalic and atraumatic.  PERRLA.  EOMI.  Sclerae white, not injected.  Nares clear.  Pharynx without erythema or exudate.  Dentition intact.    Neck: No adenopathy.  No thyromegaly. Carotids +2/2 bilaterally without bruits.  No jugular venous distension.   Heart:  The PMI is in the 5th ICS in the midclavicular line. There is no heave. Regular rate and rhythm. Normal S1, S2 splits physiologically. No murmur, rub, click, or gallop.    Lungs: Clear to asculation.  No ronchi, wheezes, rales.  No dullness to percussion.   Abdomen: Soft, nontender, nondistended. No organomegaly. No AAA.  No bruits.   Extremities: No clubbing, cyanosis, or " edema. The pulses were intact bilaterally.   Neurological: The neurological examination reveal a patient who was oriented to person, place, and time.  The remainder of the examination was nonfocal.    Cardiac tests include:    1/8/24 - echo - normal EF, Ao 4.8 cm, no change  9/22/23 - lexiscan - normal EF, no ischemia    Assessment and Plan       1. Aortic aneurysm - follow up 6 months  - manage HTN, HL  2. CAD - on ASA  3. HL - on statin   4. HTN - on lisinopril  - bradycardia limits increased beta blocker  - on amlodipine  5. Smoking cessation    The patient is to return  in 6 months with echo. The patient understood the treatment plan as outlined above.  There were no barriers to learning.      Isai Matthews MD       Please do not hesitate to contact me if you have any questions/concerns.     Sincerely,     Isai Matthews MD

## 2024-01-24 NOTE — PROGRESS NOTES
I am delighted to see Miko Gomez in consultation.Diagnoses of Aneurysm of ascending aorta without rupture (H24), Coronary artery disease involving native coronary artery of native heart without angina pectoris, Hypertension goal BP (blood pressure) < 140/90, High cholesterol, Encounter for smoking cessation counseling, and Dyspnea on exertion were pertinent to this visit.   As you know, the patient is a 73 year old  male. He   has a past medical history of Coronary artery disease involving native coronary artery of native heart without angina pectoris (07/12/2023), HTN, Hyperlipidemia, and Shortness of breath..    On this visit, the patient states that he has improved dyspnea.  The patient denies chest pressure/discomfort, dyspnea, palpitations, near-syncope, syncope, orthopnea, paroxysmal nocturnal dyspnea, and lower extermity edema.    The patient's cardiovascular risk factors include hypertension, smoking,  and high cholesterol.    The following portions of the patient's history were reviewed and updated as appropriate: allergies, current medications, past family history, past medical history, past social history, past surgical history, and the problem list.    PMH: The patient's past medical history includes:    Past Medical History:   Diagnosis Date    Coronary artery disease involving native coronary artery of native heart without angina pectoris 07/12/2023    HTN     Hyperlipidemia     Shortness of breath       Past Surgical History:   Procedure Laterality Date    ENT SURGERY      tonsils    ZZC ORAL SURGERY PROCEDURE         The patient's medications as of the current encounter are:     Current Outpatient Medications   Medication Sig Dispense Refill    amLODIPine (NORVASC) 5 MG tablet TAKE 1 TABLET (5 MG) BY MOUTH DAILY 90 tablet 3    aspirin 81 MG EC tablet Take 1 tablet (81 mg) by mouth daily 90 tablet 3    atenolol (TENORMIN) 100 MG tablet Take 1 tablet (100 mg) by mouth daily 90 tablet  3    atorvastatin (LIPITOR) 40 MG tablet Take 1 tablet (40 mg) by mouth daily 90 tablet 3    EPINEPHrine (ANY BX GENERIC EQUIV) 0.3 MG/0.3ML injection 2-pack INJECT ONE DEVICE INTO THE MUSCLE AS NEEDED FOR ALLERGIC REACTION 2 each 1    lisinopril (ZESTRIL) 40 MG tablet Take 1 tablet (40 mg) by mouth daily 90 tablet 3    Multiple Vitamins-Minerals (CENTRUM SILVER) CHEW Take  by mouth.      nicotine (NICOTROL) 10 MG inhaler Use 1 cartridge as needed for urge to smoke by puffing over course of 20min.  Use 6-16 cart/day; reduce number of cart/day over 6-12 weeks. 168 each 3       Labs:     Ancillary Procedure on 01/08/2024   Component Date Value Ref Range Status    LVEF  01/08/2024 55-60%   Final       Allergies:    Allergies   Allergen Reactions    Bee Venom Anaphylaxis    Hctz [Hydrochlorothiazide] Muscle Pain (Myalgia)       Family History:   Family History   Problem Relation Age of Onset    Cancer Father     Cancer Maternal Grandmother     Cancer Brother         neck age 48    Pancreatic Cancer Brother     Cancer Brother     No Known Problems Sister     No Known Problems Sister     No Known Problems Son     No Known Problems Daughter     Cancer Other        Psychosocial history:  reports that he has been smoking cigarettes. He has a 40 pack-year smoking history. He has never used smokeless tobacco. He reports current alcohol use. He reports that he does not use drugs.    Review of systems: negative for, palpitations, exertional chest pain or pressure, paroxysmal nocturnal dyspnea, dyspnea on exertion, orthopnea, lower extremity edema, and syncope or near-syncope    In addition,   General: No change in weight, sleep or appetite.  Normal energy.  No fever or chills  Eyes: Negative for vision changes or eye problems  ENT: No problems with ears, nose or throat.  No difficulty swallowing.  Resp: No coughing, wheezing or shortness of breath  GI: No nausea, vomiting,  heartburn, abdominal pain, diarrhea, constipation or  "change in bowel habits  : No urinary frequency or dysuria, bladder or kidney problems  Musculoskeletal: No significant muscle or joint pains  Neurologic: No headaches, numbness, tingling, weakness, problems with balance or coordination  Psychiatric: No problems with anxiety, depression or mental health  Heme/immune/allergy: No history of bleeding or clotting problems or anemia.    Endocrine: No history of thyroid disease, diabetes or other endocrine disorders  Skin: No rashes,worrisome lesions or skin problems  Vascular:  No claudication, lifestyle limiting or otherwise; no ischemic rest pain; no non-healing ulcers. No weakness, No loss of sensation        Physical examination  Vitals: BP (!) 184/92   Pulse 65   Ht 1.702 m (5' 7\")   Wt 70.3 kg (155 lb)   SpO2 96%   BMI 24.28 kg/m    BMI= Body mass index is 24.28 kg/m .    In general, the patient is a pleasant male in no apparent distress.    HEENT: Normiocephalic and atraumatic.  PERRLA.  EOMI.  Sclerae white, not injected.  Nares clear.  Pharynx without erythema or exudate.  Dentition intact.    Neck: No adenopathy.  No thyromegaly. Carotids +2/2 bilaterally without bruits.  No jugular venous distension.   Heart:  The PMI is in the 5th ICS in the midclavicular line. There is no heave. Regular rate and rhythm. Normal S1, S2 splits physiologically. No murmur, rub, click, or gallop.    Lungs: Clear to asculation.  No ronchi, wheezes, rales.  No dullness to percussion.   Abdomen: Soft, nontender, nondistended. No organomegaly. No AAA.  No bruits.   Extremities: No clubbing, cyanosis, or edema. The pulses were intact bilaterally.   Neurological: The neurological examination reveal a patient who was oriented to person, place, and time.  The remainder of the examination was nonfocal.    Cardiac tests include:    1/8/24 - echo - normal EF, Ao 4.8 cm, no change  9/22/23 - lexiscan - normal EF, no ischemia    Assessment and Plan       1. Aortic aneurysm - follow up 6 " months  - manage HTN, HL  2. CAD - on ASA  3. HL - on statin   4. HTN - on lisinopril  - bradycardia limits increased beta blocker  - on amlodipine  5. Smoking cessation    The patient is to return  in 6 months with echo. The patient understood the treatment plan as outlined above.  There were no barriers to learning.      Isai Matthews MD

## 2024-01-24 NOTE — NURSING NOTE
Cardiac Testing: Patient given instructions regarding  echocardiogram . Discussed purpose, preparation, procedure and when to expect results reported back to the patient. Patient demonstrated understanding of this information and agreed to call with further questions or concerns.  BMP and lipids ordered for refill protocol.    BP was elevated at recheck-pt advised to monitor BP at home.  Mychart readings in 1 week  Med Reconcile: Reviewed and verified all current medications with the patient. The updated medication list was printed and given to the patient.  Return Appointment: Patient given instructions regarding scheduling next clinic visit. Patient demonstrated understanding of this information and agreed to call with further questions or concerns.  Patient stated he understood all health information given and agreed to call with further questions or concerns.

## 2024-01-24 NOTE — NURSING NOTE
"Chief Complaint   Patient presents with    Follow Up       Initial BP (!) 151/95   Pulse 54   Ht 1.702 m (5' 7\")   Wt 70.3 kg (155 lb)   SpO2 96%   BMI 24.28 kg/m   Estimated body mass index is 24.28 kg/m  as calculated from the following:    Height as of this encounter: 1.702 m (5' 7\").    Weight as of this encounter: 70.3 kg (155 lb)..  BP completed using cuff size: delbert Schuler CMA (AAMA)    "

## 2024-01-24 NOTE — PATIENT INSTRUCTIONS
Thank you for coming to the [facility] at [place]; please note the following instructions:    1. Cardiology Providers: Dr. Isai Matthews has ordered an echocardiogram to be performed in June/July 2024 with a follow up with Dr. Matthews after.      Echocardiogram Instructions:  -Wear comfortable clothing  -Refrain from wearing perfumes or scented lotions    Please also call your insurance company for any billing questions regarding the echocardiogram.    2. Please monitor your blood pressure at home and send us a message or call in about a month with your blood pressure readings. We may make changes to your medications at that time.     When checking your blood pressure at home:  ~upper arm cuff is preferred versus wrist cuff due to accuracy  ~Sit in a chair  ~Rest for 5 minutes  ~Avoid alcohol, nicotine, caffeine, exercise, food or drink 30 minutes prior  ~urinate prior to checking if needed  ~Elbow is at about heart level  ~Feet flat on the floor, no crossing legs or feet  ~No talking, minimal movement   ~Place cuff on bare skin    Fasting labs to check cholesterol, kidney function and electrolytes    If you have any questions regarding your visit, please contact your care team:     CARDIOLOGY  TELEPHONE NUMBER   Yuliya MEYERS., Registered Nurse  Margie ZAYAS, Registered Nurse  Christina GROVES, Registered Nurse  Cindy NASCIMENTO, Registered Medical Assistant  Sadie MADDEN, Certified Medical Assistant  Laura LOVE, Clinic Assistant [phone] (select option 1)    *After hours: [phone]   For Scheduling Appts:     [phone] (select option 1)    *After hours: [phone]   For the Device Clinic (Pacemakers and ICD's)  Christi SOLIS, Registered Nurse   During business hours: [phone]    *After business hours:  [phone] (select option 4)      Normal test result notifications will be released via [employer] or mailed within 7 business days.  All other test results, will be communicated via telephone once reviewed by your  cardiologist.    If you need a medication refill, please contact your pharmacy.  Please allow 3 business days for your refill to be completed.    As always, thank you for trusting us with your health care needs!

## 2024-02-14 ENCOUNTER — LAB (OUTPATIENT)
Dept: LAB | Facility: CLINIC | Age: 74
End: 2024-02-14
Payer: MEDICARE

## 2024-02-14 DIAGNOSIS — R06.09 DYSPNEA ON EXERTION: ICD-10-CM

## 2024-02-14 DIAGNOSIS — I25.10 CORONARY ARTERY DISEASE INVOLVING NATIVE CORONARY ARTERY OF NATIVE HEART WITHOUT ANGINA PECTORIS: ICD-10-CM

## 2024-02-14 DIAGNOSIS — E78.00 HIGH CHOLESTEROL: ICD-10-CM

## 2024-02-14 DIAGNOSIS — Z71.6 ENCOUNTER FOR SMOKING CESSATION COUNSELING: ICD-10-CM

## 2024-02-14 DIAGNOSIS — I10 HYPERTENSION GOAL BP (BLOOD PRESSURE) < 140/90: ICD-10-CM

## 2024-02-14 DIAGNOSIS — I71.21 ANEURYSM OF ASCENDING AORTA WITHOUT RUPTURE (H): ICD-10-CM

## 2024-02-14 LAB
ANION GAP SERPL CALCULATED.3IONS-SCNC: 12 MMOL/L (ref 7–15)
BUN SERPL-MCNC: 9.5 MG/DL (ref 8–23)
CALCIUM SERPL-MCNC: 9.4 MG/DL (ref 8.8–10.2)
CHLORIDE SERPL-SCNC: 101 MMOL/L (ref 98–107)
CHOLEST SERPL-MCNC: 159 MG/DL
CREAT SERPL-MCNC: 0.94 MG/DL (ref 0.67–1.17)
DEPRECATED HCO3 PLAS-SCNC: 26 MMOL/L (ref 22–29)
EGFRCR SERPLBLD CKD-EPI 2021: 86 ML/MIN/1.73M2
FASTING STATUS PATIENT QL REPORTED: YES
GLUCOSE SERPL-MCNC: 91 MG/DL (ref 70–99)
HDLC SERPL-MCNC: 53 MG/DL
LDLC SERPL CALC-MCNC: 72 MG/DL
NONHDLC SERPL-MCNC: 106 MG/DL
POTASSIUM SERPL-SCNC: 4.2 MMOL/L (ref 3.4–5.3)
SODIUM SERPL-SCNC: 139 MMOL/L (ref 135–145)
TRIGL SERPL-MCNC: 168 MG/DL

## 2024-02-14 PROCEDURE — 80048 BASIC METABOLIC PNL TOTAL CA: CPT

## 2024-02-14 PROCEDURE — 80061 LIPID PANEL: CPT

## 2024-02-14 PROCEDURE — 36415 COLL VENOUS BLD VENIPUNCTURE: CPT

## 2024-03-27 ENCOUNTER — PATIENT OUTREACH (OUTPATIENT)
Dept: CARE COORDINATION | Facility: CLINIC | Age: 74
End: 2024-03-27
Payer: MEDICARE

## 2024-04-10 ENCOUNTER — PATIENT OUTREACH (OUTPATIENT)
Dept: CARE COORDINATION | Facility: CLINIC | Age: 74
End: 2024-04-10
Payer: MEDICARE

## 2024-04-24 ENCOUNTER — PATIENT OUTREACH (OUTPATIENT)
Dept: FAMILY MEDICINE | Facility: CLINIC | Age: 74
End: 2024-04-24
Payer: MEDICARE

## 2024-04-24 NOTE — TELEPHONE ENCOUNTER
Patient Quality Outreach    Patient is due for the following:   Hypertension -  Hypertension follow-up visit  Physical Annual Wellness Visit      Topic Date Due    COVID-19 Vaccine (7 - 2023-24 season) 02/17/2024       Next Steps:   Schedule a Annual Wellness Visit    Type of outreach:    Sent [employer] message.      Questions for provider review:    None           CARIN CARRANZA MA

## 2024-06-01 ENCOUNTER — HEALTH MAINTENANCE LETTER (OUTPATIENT)
Age: 74
End: 2024-06-01

## 2024-06-08 DIAGNOSIS — I10 ESSENTIAL HYPERTENSION WITH GOAL BLOOD PRESSURE LESS THAN 140/90: ICD-10-CM

## 2024-06-09 RX ORDER — ATENOLOL 100 MG/1
100 TABLET ORAL DAILY
Qty: 90 TABLET | Refills: 0 | Status: SHIPPED | OUTPATIENT
Start: 2024-06-09 | End: 2024-09-09

## 2024-06-17 PROBLEM — Z71.89 ADVANCED DIRECTIVES, COUNSELING/DISCUSSION: Status: RESOLVED | Noted: 2018-01-02 | Resolved: 2024-06-17

## 2024-07-09 ENCOUNTER — ANCILLARY PROCEDURE (OUTPATIENT)
Dept: CARDIOLOGY | Facility: CLINIC | Age: 74
End: 2024-07-09
Attending: INTERNAL MEDICINE
Payer: MEDICARE

## 2024-07-09 DIAGNOSIS — Z71.6 ENCOUNTER FOR SMOKING CESSATION COUNSELING: ICD-10-CM

## 2024-07-09 DIAGNOSIS — E78.00 HIGH CHOLESTEROL: ICD-10-CM

## 2024-07-09 DIAGNOSIS — I71.21 ANEURYSM OF ASCENDING AORTA WITHOUT RUPTURE (H): ICD-10-CM

## 2024-07-09 DIAGNOSIS — I25.10 CORONARY ARTERY DISEASE INVOLVING NATIVE CORONARY ARTERY OF NATIVE HEART WITHOUT ANGINA PECTORIS: ICD-10-CM

## 2024-07-09 DIAGNOSIS — I10 HYPERTENSION GOAL BP (BLOOD PRESSURE) < 140/90: ICD-10-CM

## 2024-07-09 LAB — LVEF ECHO: NORMAL

## 2024-07-09 PROCEDURE — 93306 TTE W/DOPPLER COMPLETE: CPT | Performed by: INTERNAL MEDICINE

## 2024-07-10 ENCOUNTER — OFFICE VISIT (OUTPATIENT)
Dept: CARDIOLOGY | Facility: CLINIC | Age: 74
End: 2024-07-10
Attending: INTERNAL MEDICINE
Payer: MEDICARE

## 2024-07-10 VITALS
HEART RATE: 56 BPM | HEIGHT: 67 IN | SYSTOLIC BLOOD PRESSURE: 121 MMHG | BODY MASS INDEX: 24.64 KG/M2 | DIASTOLIC BLOOD PRESSURE: 83 MMHG | WEIGHT: 157 LBS | OXYGEN SATURATION: 96 %

## 2024-07-10 DIAGNOSIS — I25.10 CORONARY ARTERY DISEASE INVOLVING NATIVE CORONARY ARTERY OF NATIVE HEART WITHOUT ANGINA PECTORIS: ICD-10-CM

## 2024-07-10 DIAGNOSIS — E78.00 HIGH CHOLESTEROL: ICD-10-CM

## 2024-07-10 DIAGNOSIS — I71.21 ANEURYSM OF ASCENDING AORTA WITHOUT RUPTURE (H): ICD-10-CM

## 2024-07-10 DIAGNOSIS — I10 HYPERTENSION GOAL BP (BLOOD PRESSURE) < 140/90: Primary | ICD-10-CM

## 2024-07-10 PROCEDURE — 99213 OFFICE O/P EST LOW 20 MIN: CPT | Performed by: INTERNAL MEDICINE

## 2024-07-10 NOTE — LETTER
7/10/2024      RE: Miko Gomez  [id number] [address] [place] [id number]       Dear Colleague,    Thank you for the opportunity to participate in the care of your patient, Miko Gomez, at the [facility]. Please see a copy of my visit note below.    I am delighted to see Miko Gomez in consultation.The primary encounter diagnosis was Hypertension goal BP (blood pressure) < 140/90. Diagnoses of High cholesterol, Coronary artery disease involving native coronary artery of native heart without angina pectoris, and Aneurysm of ascending aorta without rupture (H24) were also pertinent to this visit.   As you know, the patient is a 73 year old  male. He   has a past medical history of Coronary artery disease involving native coronary artery of native heart without angina pectoris (07/12/2023), HTN, Hyperlipidemia, and Shortness of breath..    On this visit, the patient states that he has good exercise tolerance.  The patient denies chest pressure/discomfort, dyspnea, palpitations, near-syncope, syncope, orthopnea, paroxysmal nocturnal dyspnea, and lower extermity edema.    The patient's cardiovascular risk factors include known cardiac disease, hypertension, and high cholesterol.    The following portions of the patient's history were reviewed and updated as appropriate: allergies, current medications, past family history, past medical history, past social history, past surgical history, and the problem list.    PMH: The patient's past medical history includes:    Past Medical History:   Diagnosis Date    Coronary artery disease involving native coronary artery of native heart without angina pectoris 07/12/2023    HTN     Hyperlipidemia     Shortness of breath       Past Surgical History:   Procedure Laterality Date    ENT SURGERY      tonsils    ZZC ORAL SURGERY PROCEDURE         The patient's medications as  of the current encounter are:     Current Outpatient Medications   Medication Sig Dispense Refill    amLODIPine (NORVASC) 5 MG tablet TAKE 1 TABLET (5 MG) BY MOUTH DAILY 90 tablet 3    aspirin 81 MG EC tablet Take 1 tablet (81 mg) by mouth daily 90 tablet 3    atenolol (TENORMIN) 100 MG tablet TAKE 1 TABLET (100 MG) BY MOUTH DAILY 90 tablet 0    atorvastatin (LIPITOR) 40 MG tablet Take 1 tablet (40 mg) by mouth daily 90 tablet 3    EPINEPHrine (ANY BX GENERIC EQUIV) 0.3 MG/0.3ML injection 2-pack INJECT ONE DEVICE INTO THE MUSCLE AS NEEDED FOR ALLERGIC REACTION 2 each 1    lisinopril (ZESTRIL) 40 MG tablet Take 1 tablet (40 mg) by mouth daily 90 tablet 3    Multiple Vitamins-Minerals (CENTRUM SILVER) CHEW Take  by mouth.      nicotine (NICOTROL) 10 MG inhaler Use 1 cartridge as needed for urge to smoke by puffing over course of 20min.  Use 6-16 cart/day; reduce number of cart/day over 6-12 weeks. 168 each 3       Labs:     Ancillary Procedure on 07/09/2024   Component Date Value Ref Range Status    LVEF  07/09/2024 55-60%   Final       Allergies:    Allergies   Allergen Reactions    Bee Venom Anaphylaxis    Hctz [Hydrochlorothiazide] Muscle Pain (Myalgia)       Family History:   Family History   Problem Relation Age of Onset    Cancer Father     Cancer Maternal Grandmother     Cancer Brother         neck age 48    Pancreatic Cancer Brother     Cancer Brother     No Known Problems Sister     No Known Problems Sister     No Known Problems Son     No Known Problems Daughter     Cancer Other        Psychosocial history:  reports that he has been smoking cigarettes. He has a 40 pack-year smoking history. He has never used smokeless tobacco. He reports current alcohol use. He reports that he does not use drugs.    Review of systems: negative for, palpitations, exertional chest pain or pressure, paroxysmal nocturnal dyspnea, dyspnea on exertion, orthopnea, lower extremity edema, syncope or near-syncope, and exercise  "intolerance    In addition,   General: No change in weight, sleep or appetite.  Normal energy.  No fever or chills  Eyes: Negative for vision changes or eye problems  ENT: No problems with ears, nose or throat.  No difficulty swallowing.  Resp: No coughing, wheezing or shortness of breath  GI: No nausea, vomiting,  heartburn, abdominal pain, diarrhea, constipation or change in bowel habits  : No urinary frequency or dysuria, bladder or kidney problems  Musculoskeletal: No significant muscle or joint pains  Neurologic: No headaches, numbness, tingling, weakness, problems with balance or coordination  Psychiatric: No problems with anxiety, depression or mental health  Heme/immune/allergy: No history of bleeding or clotting problems or anemia.    Endocrine: No history of thyroid disease, diabetes or other endocrine disorders  Skin: No rashes,worrisome lesions or skin problems  Vascular:  No claudication, lifestyle limiting or otherwise; no ischemic rest pain; no non-healing ulcers. No weakness, No loss of sensation      Physical examination  Vitals: /83   Pulse 56   Ht 1.702 m (5' 7\")   Wt 71.2 kg (157 lb)   SpO2 96%   BMI 24.59 kg/m    BMI= Body mass index is 24.59 kg/m .    In general, the patient is a pleasant male in no apparent distress.    HEENT: Normiocephalic and atraumatic.  PERRLA.  EOMI.  Sclerae white, not injected.    Neck: No adenopathy.  No thyromegaly. Carotids +2/2 bilaterally without bruits.  No jugular venous distension.   Heart:  The PMI is in the 5th ICS in the midclavicular line. There is no heave. Regular rate and rhythm. Normal S1, S2 splits physiologically. No murmur, rub, click, or gallop.    Lungs: Clear to asculation.  No ronchi, wheezes, rales.  No dullness to percussion.   Abdomen: Soft, nontender, nondistended. No organomegaly. No AAA.  No bruits.   Extremities: No clubbing, cyanosis, or edema. The pulses were intact bilaterally.   Neurological: The neurological examination " reveal a patient who was oriented to person, place, and time.  The remainder of the examination was nonfocal.    Cardiac tests include:    7/9/24 - echo - normal EF, Ao 4.9 0.1 cm change    Assessment and Plan    1. Aortic aneurysm - follow up 6 months  - manage HTN, HL  2. CAD - on ASA  3. HL - on statin   4. HTN - on lisinopril  - bradycardia limits increased beta blocker  - on amlodipine  5. Smoking cessation    The patient is to return  in 6 months. The patient understood the treatment plan as outlined above.  There were no barriers to learning.      Isai Matthews MD     Please do not hesitate to contact me if you have any questions/concerns.     Sincerely,     Isai Matthews MD

## 2024-07-10 NOTE — PATIENT INSTRUCTIONS
Thank you for coming to the [facility] at [place]; please note the following instructions:    1. Cardiology Providers: Dr. Isai Matthews has requested you to have a Cardiac CT Angiogram.  This has been scheduled at the [facility] on 1/10/25 at 8:40 am; please arrive at the [facility] WAITING AREA 20 MINUTES PRIOR (8:20 AM).    Please follow these INSTRUCTIONS for PREP of your CT SCAN:  1.  PLEASE DO NOT EAT OR DRINK 3 HOURS PRIOR TO PROCEDURE  2.  PLEASE DO NOT USE ALCOHOL, CAFFEINE OR TOBACCO 12 HOURS PRIOR TO THE PROCEDURE    Please also call your insurance company for any billing questions regarding the test.    2. Follow up with Regina ÁLVAREZ after CTA is completed.     3. Dr. Matthews recommends that you quit smoking.           If you have any questions regarding your visit, please contact your care team:     CARDIOLOGY  TELEPHONE NUMBER   Yuliya MEYERS., Registered Nurse  Margie ZAYAS, Registered Nurse  Christina GROVES, Registered Nurse  Cindy NASCIMENTO, Registered Medical Assistant  Sadie MADDEN, Certified Medical Assistant  Laura LOVE, Clinic Assistant [phone] (select option 1)    *After hours: [phone]   For Scheduling Appts:     [phone] (select option 1)    *After hours: [phone]   For the Device Clinic (Pacemakers and ICD's)  Christi SOLIS, Registered Nurse   During business hours: [phone]    *After business hours:  [phone] (select option 4)      Normal test result notifications will be released via [employer] or mailed within 7 business days.  All other test results, will be communicated via telephone once reviewed by your cardiologist.    If you need a medication refill, please contact your pharmacy.  Please allow 3 business days for your refill to be completed.    As always, thank you for trusting us with your health care needs!

## 2024-07-10 NOTE — NURSING NOTE
"Chief Complaint   Patient presents with    Follow Up       Initial /83   Pulse 56   Ht 1.702 m (5' 7\")   Wt 71.2 kg (157 lb)   SpO2 96%   BMI 24.59 kg/m   Estimated body mass index is 24.59 kg/m  as calculated from the following:    Height as of this encounter: 1.702 m (5' 7\").    Weight as of this encounter: 71.2 kg (157 lb)..  BP completed using cuff size: delbert Schuler CMA ([facility])    "

## 2024-07-26 DIAGNOSIS — E78.00 HIGH CHOLESTEROL: ICD-10-CM

## 2024-07-26 DIAGNOSIS — I10 ESSENTIAL HYPERTENSION WITH GOAL BLOOD PRESSURE LESS THAN 140/90: ICD-10-CM

## 2024-07-31 RX ORDER — ATORVASTATIN CALCIUM 40 MG/1
40 TABLET, FILM COATED ORAL DAILY
Qty: 90 TABLET | Refills: 3 | Status: SHIPPED | OUTPATIENT
Start: 2024-07-31

## 2024-07-31 RX ORDER — LISINOPRIL 40 MG/1
40 TABLET ORAL DAILY
Qty: 90 TABLET | Refills: 3 | Status: SHIPPED | OUTPATIENT
Start: 2024-07-31

## 2024-07-31 NOTE — TELEPHONE ENCOUNTER
Last Clinic Visit: 7/10/2024  [facility] [place]

## 2024-08-26 ENCOUNTER — PATIENT OUTREACH (OUTPATIENT)
Dept: FAMILY MEDICINE | Facility: CLINIC | Age: 74
End: 2024-08-26
Payer: MEDICARE

## 2024-08-26 NOTE — TELEPHONE ENCOUNTER
Patient Quality Outreach    Patient is due for the following:   Physical Annual Wellness Visit      Topic Date Due    COVID-19 Vaccine (7 - 2023-24 season) 02/17/2024       Next Steps:   Schedule a Annual Wellness Visit    Type of outreach:    Sent [employer] message.      Questions for provider review:    None           CARIN CARRANZA MA

## 2024-09-09 DIAGNOSIS — I10 ESSENTIAL HYPERTENSION WITH GOAL BLOOD PRESSURE LESS THAN 140/90: ICD-10-CM

## 2024-09-09 RX ORDER — ATENOLOL 100 MG/1
TABLET ORAL
Qty: 90 TABLET | Refills: 0 | Status: SHIPPED | OUTPATIENT
Start: 2024-09-09

## 2024-10-17 ENCOUNTER — PATIENT OUTREACH (OUTPATIENT)
Dept: CARE COORDINATION | Facility: CLINIC | Age: 74
End: 2024-10-17
Payer: MEDICARE

## 2024-10-23 ENCOUNTER — PATIENT OUTREACH (OUTPATIENT)
Dept: CARE COORDINATION | Facility: CLINIC | Age: 74
End: 2024-10-23
Payer: MEDICARE

## 2024-12-05 DIAGNOSIS — I10 ESSENTIAL HYPERTENSION WITH GOAL BLOOD PRESSURE LESS THAN 140/90: ICD-10-CM

## 2024-12-05 RX ORDER — ATENOLOL 100 MG/1
TABLET ORAL
Qty: 90 TABLET | Refills: 0 | Status: SHIPPED | OUTPATIENT
Start: 2024-12-05

## 2025-01-10 ENCOUNTER — ANCILLARY PROCEDURE (OUTPATIENT)
Dept: CT IMAGING | Facility: CLINIC | Age: 75
End: 2025-01-10
Attending: INTERNAL MEDICINE
Payer: MEDICARE

## 2025-01-10 DIAGNOSIS — I71.21 ANEURYSM OF ASCENDING AORTA WITHOUT RUPTURE: ICD-10-CM

## 2025-01-10 LAB
CREAT BLD-MCNC: 1 MG/DL (ref 0.7–1.3)
EGFRCR SERPLBLD CKD-EPI 2021: >60 ML/MIN/1.73M2

## 2025-01-10 PROCEDURE — 71275 CT ANGIOGRAPHY CHEST: CPT | Mod: TC | Performed by: RADIOLOGY

## 2025-01-10 PROCEDURE — 82565 ASSAY OF CREATININE: CPT

## 2025-01-10 RX ORDER — IOPAMIDOL 755 MG/ML
500 INJECTION, SOLUTION INTRAVASCULAR ONCE
Status: COMPLETED | OUTPATIENT
Start: 2025-01-10 | End: 2025-01-10

## 2025-01-10 RX ADMIN — IOPAMIDOL 72 ML: 755 INJECTION, SOLUTION INTRAVASCULAR at 10:19

## 2025-01-10 RX ADMIN — Medication 78 ML: at 10:19

## 2025-01-13 NOTE — PROGRESS NOTES
[facility]    Patient Name: Miko Gomez   : 1950   Date of Visit: 2025  Primary Care Physician: Virgil Ledbetter MD         Mkio Gomez is a pleasant 74 year old male, who presents for 6 month follow up. Prior history significant for HTN, HLD, CAD, AAA.    Patient of Dr. Matthews, last seen in clinic on 7/10/24. He was feeling asymptomatic from a cardiac standpoint and reported good exercise tolerance. CTA Chest was ordered for regular AAA surveillance. This was completed on 1/10/25 and showed dilation of the aortic root and ascending thoracic aorta measuring 4.3 cm and 4.9 cm in diameter respectively, similar to 2023 CT and 24 echo. Unchanged pulmonary nodule, but new thyroid nodule that was recommended for further evaluation.    Today in clinic, patient denies chest pain, palpitations. He feels that he is really deconditioned after he was retired for 4 years. He is back working full time as a  for a local company. Patient is on his feet a lot and is up and down a lot of stairs.     Home BPs: not checking at home, has BP cuff but doesn't use it    Patient says he has not been Amlodipine 5 mg daily although it is on his medication list. He does not recall ever taking this medication.    Current smoker, less than 1 PPD, used to smoke 2 PPD, >40 pack year smoking history. Daily drinker, 3 glasses of patrice/day. No known family history of AAA.     Current Cardiac Medications  ASA 81 mg daily  Atenolol 100 mg daily  Atorvastatin 40 mg daily  Lisinopril 40 mg daily      PAST MEDICAL HISTORY:  Past Medical History:   Diagnosis Date    Coronary artery disease involving native coronary artery of native heart without angina pectoris 2023    HTN     Hyperlipidemia     Shortness of breath        PAST SURGICAL HISTORY:  Past Surgical History:   Procedure Laterality Date    ENT SURGERY      tonsils    ZZC ORAL SURGERY PROCEDURE         FAMILY HISTORY:  Family  History   Problem Relation Age of Onset    Cancer Father     Cancer Maternal Grandmother     Cancer Brother         neck age 48    Pancreatic Cancer Brother     Cancer Brother     No Known Problems Sister     No Known Problems Sister     No Known Problems Son     No Known Problems Daughter     Cancer Other        SOCIAL HISTORY:  Social History     Socioeconomic History    Marital status:    Tobacco Use    Smoking status: Every Day     Current packs/day: 1.00     Average packs/day: 1 pack/day for 40.0 years (40.0 ttl pk-yrs)     Types: Cigarettes    Smokeless tobacco: Never   Vaping Use    Vaping status: Never Used   Substance and Sexual Activity    Alcohol use: Yes     Comment: CHERYL EVERYDAY    Drug use: No    Sexual activity: Yes     Partners: Female       MEDICATIONS:  Current Outpatient Medications   Medication Sig Dispense Refill    amLODIPine (NORVASC) 5 MG tablet Take 1 tablet (5 mg) by mouth daily. 90 tablet 3    aspirin 81 MG EC tablet Take 1 tablet (81 mg) by mouth daily 90 tablet 3    atenolol (TENORMIN) 100 MG tablet TAKE ONE TABLET BY MOUTH ONCE DAILY ++  BLOOD PRESSURE CHECK NEEDED 90 tablet 0    atorvastatin (LIPITOR) 40 MG tablet Take 1 tablet (40 mg) by mouth daily 90 tablet 3    EPINEPHrine (ANY BX GENERIC EQUIV) 0.3 MG/0.3ML injection 2-pack INJECT ONE DEVICE INTO THE MUSCLE AS NEEDED FOR ALLERGIC REACTION 2 each 1    lisinopril (ZESTRIL) 40 MG tablet Take 1 tablet (40 mg) by mouth daily 90 tablet 3    Multiple Vitamins-Minerals (CENTRUM SILVER) CHEW Take  by mouth.      nicotine (NICOTROL) 10 MG inhaler Use 1 cartridge as needed for urge to smoke by puffing over course of 20min.  Use 6-16 cart/day; reduce number of cart/day over 6-12 weeks. 168 each 3     No current facility-administered medications for this visit.     Facility-Administered Medications Ordered in Other Visits   Medication Dose Route Frequency Provider Last Rate Last Admin    sodium chloride (PF) 0.9% PF flush 3 mL  3 mL  Intracatheter Q8H Isai Matthews MD   3 mL at 07/25/23 1140        ALLERGIES:     Allergies   Allergen Reactions    Bee Venom Anaphylaxis    Hctz [Hydrochlorothiazide] Muscle Pain (Myalgia)       ROS:  A complete 10-point ROS was negative except as above.    PHYSICAL EXAM:  BP (!) 171/112 (BP Location: Left arm, Patient Position: Chair, Cuff Size: Adult Regular)   Pulse 60   Wt 70.3 kg (155 lb)   SpO2 100%   BMI 24.28 kg/m    Gen: alert, interactive, NAD  Neck: supple, no JVD  CV: RRR, no m/r/g  Chest: CTAB, no wheezes or crackles  Abd: soft, NT, ND, +BS  Ext: no LE edema    LABS:    CMP  Last Comprehensive Metabolic Panel:  Sodium   Date Value Ref Range Status   02/14/2024 139 135 - 145 mmol/L Final     Comment:     Reference intervals for this test were updated on 09/26/2023 to more accurately reflect our healthy population. There may be differences in the flagging of prior results with similar values performed with this method. Interpretation of those prior results can be made in the context of the updated reference intervals.    10/06/2020 137 133 - 144 mmol/L Final     Potassium   Date Value Ref Range Status   02/14/2024 4.2 3.4 - 5.3 mmol/L Final   04/18/2023 3.6 3.4 - 5.3 mmol/L Final   10/06/2020 4.2 3.4 - 5.3 mmol/L Final     Chloride   Date Value Ref Range Status   02/14/2024 101 98 - 107 mmol/L Final   04/18/2023 106 94 - 109 mmol/L Final   10/06/2020 104 94 - 109 mmol/L Final     Carbon Dioxide   Date Value Ref Range Status   10/06/2020 29 20 - 32 mmol/L Final     Carbon Dioxide (CO2)   Date Value Ref Range Status   02/14/2024 26 22 - 29 mmol/L Final   04/18/2023 28 20 - 32 mmol/L Final     Anion Gap   Date Value Ref Range Status   02/14/2024 12 7 - 15 mmol/L Final   04/18/2023 6 3 - 14 mmol/L Final   10/06/2020 4 3 - 14 mmol/L Final     Glucose   Date Value Ref Range Status   02/14/2024 91 70 - 99 mg/dL Final   04/18/2023 89 70 - 99 mg/dL Final   10/06/2020 89 70 - 99 mg/dL Final     Comment:      "Fasting specimen     Urea Nitrogen   Date Value Ref Range Status   02/14/2024 9.5 8.0 - 23.0 mg/dL Final   04/18/2023 5 (L) 7 - 30 mg/dL Final   10/06/2020 16 7 - 30 mg/dL Final     Creatinine   Date Value Ref Range Status   02/14/2024 0.94 0.67 - 1.17 mg/dL Final   10/06/2020 0.87 0.66 - 1.25 mg/dL Final     Creatinine POCT   Date Value Ref Range Status   01/10/2025 1.0 0.7 - 1.3 mg/dL Final     GFR Estimate   Date Value Ref Range Status   10/06/2020 88 >60 mL/min/[1.73_m2] Final     Comment:     Non  GFR Calc  Starting 12/18/2018, serum creatinine based estimated GFR (eGFR) will be   calculated using the Chronic Kidney Disease Epidemiology Collaboration   (CKD-EPI) equation.       GFR, ESTIMATED POCT   Date Value Ref Range Status   01/10/2025 >60 >60 mL/min/1.73m2 Final     Calcium   Date Value Ref Range Status   02/14/2024 9.4 8.8 - 10.2 mg/dL Final   10/06/2020 9.0 8.5 - 10.1 mg/dL Final     Bilirubin Total   Date Value Ref Range Status   04/18/2023 0.9 0.2 - 1.3 mg/dL Final   10/06/2020 0.8 0.2 - 1.3 mg/dL Final     Alkaline Phosphatase   Date Value Ref Range Status   04/18/2023 68 40 - 150 U/L Final   10/06/2020 57 40 - 150 U/L Final     ALT   Date Value Ref Range Status   04/18/2023 68 0 - 70 U/L Final   10/06/2020 39 0 - 70 U/L Final     AST   Date Value Ref Range Status   04/18/2023 63 (H) 0 - 45 U/L Final   10/06/2020 22 0 - 45 U/L Final       TROP  No results found for: \"TROPI\", \"TROPONIN\", \"TROPR\", \"TROPN\"    CBC  CBC RESULTS: No results for input(s): \"WBC\", \"RBC\", \"HGB\", \"HCT\", \"MCV\", \"MCH\", \"MCHC\", \"RDW\", \"PLT\" in the last 02316 hours.    LIPIDS  Recent Labs   Lab Test 02/14/24  1020 04/18/23  0918   CHOL 159 157   HDL 53 75   LDL 72 61   TRIG 168* 104       TSH  TSH   Date Value Ref Range Status   08/28/2013 3.52 0.4 - 5.0 mU/L Final       HBA1C  No results found for: \"A1C\"      CARDIAC DATA:     Echo 7/9/24:  Left ventricular size, wall motion and function are normal. The " ejection  fraction is 55-60%.     The right ventricle is normal size. Global right ventricular function is  normal.     Moderately dilated ascending aorta. Aortic root 4.5 cm, ascending aorta 4.9  cm.     Pulmonary artery systolic pressure is normal.     The inferior vena cava is normal.     No pericardial effusion is present.     Compared to prior study on 1/8/2024 there are no hemodynamically significant  differences.    Stress Test 9/22/23    The nuclear stress test is negative for inducible myocardial ischemia or infarction.    Left ventricular function is normal.    The left ventricular ejection fraction at rest is 80%.    CTA Chest 1/10/25:  CT ANGIOGRAM CHEST the thoracic and visualized abdominal aorta is patent with mild calcified atherosclerotic and atheromatous disease. Normal great vessel branching pattern, the visualized great vessels are widely patent where seen. Dilation of the   aortic root measuring 4.3 cm. The ascending thoracic aorta is also dilated measuring up to 4.9 cm. These findings are similar to 5/1/2023.     LUNGS AND PLEURA: Biapical scarring. Intrapleural lymph node along the right minor fissure. Emphysema. 4 mm nodule in the right upper lobe on series 8 image 77.     MEDIASTINUM/AXILLAE: 6 mm hypodense right thyroid lobe nodule. Mild cardiac medically. No significant pericardial effusion. No bulky lymphadenopathy.     CORONARY ARTERY CALCIFICATION: Moderate.     UPPER ABDOMEN: Normal     MUSCULOSKELETAL: Degenerative disease of both shoulders and visualized cervical spine.    1.  Dilation of the aortic root and ascending thoracic aorta measuring 4.3 cm and 4.9 cm in diameter respectively. Similar to 5/1/2023.  2.  4 mm pulmonary nodule in the right upper lobe is not significantly changed from 5/1/2023.  3.  Ill-defined 6 mm right thyroid lobe hypodense nodule. Recommend formal evaluation with dedicated thyroid ultrasound.      ASSESSMENT/PLAN:  In summary, Miko Gomez is here  today with the following -      # CAD (moderate on 1/10/25 CT)  # Aortic Aneurysm  # Hypertension  # Hyperlipidemia    Blood pressure elevated in clinic today. Not checking at home.    Most recent lipid panel on 2/14/24 with LDL 72, . CTA on 1/10/25 with dilation of the aortic root and ascending thoracic aorta measuring 4.3 cm and 4.9 cm in diameter respectively and similar to 5/1/2023 CT and 7/9/24 echo. Unchanged pulmonary nodule, but new thyroid nodule recommended for further evaluation.    Patient reports no cardiac symptoms in clinic today.    - Continue annual surveillance imaging for aortic aneurysm  - Continue ASA and statin for HLD/CAD  - Take amlodipine 5 mg daily  - Continue lisinopril and atenolol for HTN (bradycardia limits increased BB)  - Check BP daily  - RN BP log check in 3 weeks  - Lifestyle modifications for risk factor management: maintain healthy weight, aerobic and strength exercise with goal of 150 minutes per week, healthy diet with emphasis in fruits, vegetables and fiber, limit alcohol intake, stop smoking, and avoid processed and prepackaged foods       Follow up:  - RN BP check in 3 weeks  - with PCP about incidental findings of thyroid nodule  - in 1 year with echo prior      PREETI Kamara, MATTP-C  [facility] General Cardiology  Securely message with [employer]   Text page via [employer]          Chart review time: 10 minutes  Visit time: 37 minutes  Chart completion/documentation: 4 minutes  Total time spent: 51 minutes

## 2025-01-14 ENCOUNTER — OFFICE VISIT (OUTPATIENT)
Dept: CARDIOLOGY | Facility: CLINIC | Age: 75
End: 2025-01-14
Payer: MEDICARE

## 2025-01-14 VITALS
OXYGEN SATURATION: 100 % | DIASTOLIC BLOOD PRESSURE: 112 MMHG | HEART RATE: 60 BPM | WEIGHT: 155 LBS | BODY MASS INDEX: 24.28 KG/M2 | SYSTOLIC BLOOD PRESSURE: 171 MMHG

## 2025-01-14 DIAGNOSIS — I71.21 ANEURYSM OF ASCENDING AORTA WITHOUT RUPTURE: Primary | ICD-10-CM

## 2025-01-14 DIAGNOSIS — E78.00 HIGH CHOLESTEROL: ICD-10-CM

## 2025-01-14 DIAGNOSIS — I10 ESSENTIAL HYPERTENSION WITH GOAL BLOOD PRESSURE LESS THAN 140/90: ICD-10-CM

## 2025-01-14 DIAGNOSIS — I10 HYPERTENSION GOAL BP (BLOOD PRESSURE) < 140/90: ICD-10-CM

## 2025-01-14 DIAGNOSIS — I25.10 CORONARY ARTERY DISEASE INVOLVING NATIVE CORONARY ARTERY OF NATIVE HEART WITHOUT ANGINA PECTORIS: ICD-10-CM

## 2025-01-14 RX ORDER — AMLODIPINE BESYLATE 5 MG/1
5 TABLET ORAL DAILY
Qty: 90 TABLET | Refills: 3 | Status: SHIPPED | OUTPATIENT
Start: 2025-01-14

## 2025-01-14 NOTE — NURSING NOTE
"Chief Complaint   Patient presents with    Follow Up     S/P CTA       Initial BP (!) 170/110 (BP Location: Left arm, Patient Position: Chair, Cuff Size: Adult Regular)   Pulse 55   Wt 70.3 kg (155 lb)   SpO2 99%   BMI 24.28 kg/m   Estimated body mass index is 24.28 kg/m  as calculated from the following:    Height as of 7/10/24: 1.702 m (5' 7\").    Weight as of this encounter: 70.3 kg (155 lb)..  BP completed using cuff size: regular    Faby Romero, Visit Facilitator    "

## 2025-01-14 NOTE — LETTER
2025      RE: Miko Gomez  [address] [address] [place] [id number]       Dear Colleague,    Thank you for the opportunity to participate in the care of your patient, Miko Gomez, at the [facility]. Please see a copy of my visit note below.        [facility]    Patient Name: Miko Gomez   : 1950   Date of Visit: 2025  Primary Care Physician: Virgil Ledbetter MD         Miko Gomez is a pleasant 74 year old male, who presents for 6 month follow up. Prior history significant for HTN, HLD, CAD, AAA.    Patient of Dr. Matthews, last seen in clinic on 7/10/24. He was feeling asymptomatic from a cardiac standpoint and reported good exercise tolerance. CTA Chest was ordered for regular AAA surveillance. This was completed on 1/10/25 and showed dilation of the aortic root and ascending thoracic aorta measuring 4.3 cm and 4.9 cm in diameter respectively, similar to 2023 CT and 24 echo. Unchanged pulmonary nodule, but new thyroid nodule that was recommended for further evaluation.    Today in clinic, patient denies chest pain, palpitations. He feels that he is really deconditioned after he was retired for 4 years. He is back working full time as a  for a local company. Patient is on his feet a lot and is up and down a lot of stairs.     Home BPs: not checking at home, has BP cuff but doesn't use it    Patient says he has not been Amlodipine 5 mg daily although it is on his medication list. He does not recall ever taking this medication.    Current smoker, less than 1 PPD, used to smoke 2 PPD, >40 pack year smoking history. Daily drinker, 3 glasses of patrice/day. No known family history of AAA.     Current Cardiac Medications  ASA 81 mg daily  Atenolol 100 mg daily  Atorvastatin 40 mg daily  Lisinopril 40 mg daily      PAST MEDICAL HISTORY:  Past Medical History:    Diagnosis Date     Coronary artery disease involving native coronary artery of native heart without angina pectoris 07/12/2023     HTN      Hyperlipidemia      Shortness of breath        PAST SURGICAL HISTORY:  Past Surgical History:   Procedure Laterality Date     ENT SURGERY      tonsils     ZZC ORAL SURGERY PROCEDURE         FAMILY HISTORY:  Family History   Problem Relation Age of Onset     Cancer Father      Cancer Maternal Grandmother      Cancer Brother         neck age 48     Pancreatic Cancer Brother      Cancer Brother      No Known Problems Sister      No Known Problems Sister      No Known Problems Son      No Known Problems Daughter      Cancer Other        SOCIAL HISTORY:  Social History     Socioeconomic History     Marital status:    Tobacco Use     Smoking status: Every Day     Current packs/day: 1.00     Average packs/day: 1 pack/day for 40.0 years (40.0 ttl pk-yrs)     Types: Cigarettes     Smokeless tobacco: Never   Vaping Use     Vaping status: Never Used   Substance and Sexual Activity     Alcohol use: Yes     Comment: CHERYL EVERYDAY     Drug use: No     Sexual activity: Yes     Partners: Female       MEDICATIONS:  Current Outpatient Medications   Medication Sig Dispense Refill     amLODIPine (NORVASC) 5 MG tablet Take 1 tablet (5 mg) by mouth daily. 90 tablet 3     aspirin 81 MG EC tablet Take 1 tablet (81 mg) by mouth daily 90 tablet 3     atenolol (TENORMIN) 100 MG tablet TAKE ONE TABLET BY MOUTH ONCE DAILY ++  BLOOD PRESSURE CHECK NEEDED 90 tablet 0     atorvastatin (LIPITOR) 40 MG tablet Take 1 tablet (40 mg) by mouth daily 90 tablet 3     EPINEPHrine (ANY BX GENERIC EQUIV) 0.3 MG/0.3ML injection 2-pack INJECT ONE DEVICE INTO THE MUSCLE AS NEEDED FOR ALLERGIC REACTION 2 each 1     lisinopril (ZESTRIL) 40 MG tablet Take 1 tablet (40 mg) by mouth daily 90 tablet 3     Multiple Vitamins-Minerals (CENTRUM SILVER) CHEW Take  by mouth.       nicotine (NICOTROL) 10 MG inhaler Use 1  cartridge as needed for urge to smoke by puffing over course of 20min.  Use 6-16 cart/day; reduce number of cart/day over 6-12 weeks. 168 each 3     No current facility-administered medications for this visit.     Facility-Administered Medications Ordered in Other Visits   Medication Dose Route Frequency Provider Last Rate Last Admin     sodium chloride (PF) 0.9% PF flush 3 mL  3 mL Intracatheter Q8H Isai Matthews MD   3 mL at 07/25/23 1140        ALLERGIES:     Allergies   Allergen Reactions     Bee Venom Anaphylaxis     Hctz [Hydrochlorothiazide] Muscle Pain (Myalgia)       ROS:  A complete 10-point ROS was negative except as above.    PHYSICAL EXAM:  BP (!) 171/112 (BP Location: Left arm, Patient Position: Chair, Cuff Size: Adult Regular)   Pulse 60   Wt 70.3 kg (155 lb)   SpO2 100%   BMI 24.28 kg/m    Gen: alert, interactive, NAD  Neck: supple, no JVD  CV: RRR, no m/r/g  Chest: CTAB, no wheezes or crackles  Abd: soft, NT, ND, +BS  Ext: no LE edema    LABS:    CMP  Last Comprehensive Metabolic Panel:  Sodium   Date Value Ref Range Status   02/14/2024 139 135 - 145 mmol/L Final     Comment:     Reference intervals for this test were updated on 09/26/2023 to more accurately reflect our healthy population. There may be differences in the flagging of prior results with similar values performed with this method. Interpretation of those prior results can be made in the context of the updated reference intervals.    10/06/2020 137 133 - 144 mmol/L Final     Potassium   Date Value Ref Range Status   02/14/2024 4.2 3.4 - 5.3 mmol/L Final   04/18/2023 3.6 3.4 - 5.3 mmol/L Final   10/06/2020 4.2 3.4 - 5.3 mmol/L Final     Chloride   Date Value Ref Range Status   02/14/2024 101 98 - 107 mmol/L Final   04/18/2023 106 94 - 109 mmol/L Final   10/06/2020 104 94 - 109 mmol/L Final     Carbon Dioxide   Date Value Ref Range Status   10/06/2020 29 20 - 32 mmol/L Final     Carbon Dioxide (CO2)   Date Value Ref Range Status  "  02/14/2024 26 22 - 29 mmol/L Final   04/18/2023 28 20 - 32 mmol/L Final     Anion Gap   Date Value Ref Range Status   02/14/2024 12 7 - 15 mmol/L Final   04/18/2023 6 3 - 14 mmol/L Final   10/06/2020 4 3 - 14 mmol/L Final     Glucose   Date Value Ref Range Status   02/14/2024 91 70 - 99 mg/dL Final   04/18/2023 89 70 - 99 mg/dL Final   10/06/2020 89 70 - 99 mg/dL Final     Comment:     Fasting specimen     Urea Nitrogen   Date Value Ref Range Status   02/14/2024 9.5 8.0 - 23.0 mg/dL Final   04/18/2023 5 (L) 7 - 30 mg/dL Final   10/06/2020 16 7 - 30 mg/dL Final     Creatinine   Date Value Ref Range Status   02/14/2024 0.94 0.67 - 1.17 mg/dL Final   10/06/2020 0.87 0.66 - 1.25 mg/dL Final     Creatinine POCT   Date Value Ref Range Status   01/10/2025 1.0 0.7 - 1.3 mg/dL Final     GFR Estimate   Date Value Ref Range Status   10/06/2020 88 >60 mL/min/[1.73_m2] Final     Comment:     Non  GFR Calc  Starting 12/18/2018, serum creatinine based estimated GFR (eGFR) will be   calculated using the Chronic Kidney Disease Epidemiology Collaboration   (CKD-EPI) equation.       GFR, ESTIMATED POCT   Date Value Ref Range Status   01/10/2025 >60 >60 mL/min/1.73m2 Final     Calcium   Date Value Ref Range Status   02/14/2024 9.4 8.8 - 10.2 mg/dL Final   10/06/2020 9.0 8.5 - 10.1 mg/dL Final     Bilirubin Total   Date Value Ref Range Status   04/18/2023 0.9 0.2 - 1.3 mg/dL Final   10/06/2020 0.8 0.2 - 1.3 mg/dL Final     Alkaline Phosphatase   Date Value Ref Range Status   04/18/2023 68 40 - 150 U/L Final   10/06/2020 57 40 - 150 U/L Final     ALT   Date Value Ref Range Status   04/18/2023 68 0 - 70 U/L Final   10/06/2020 39 0 - 70 U/L Final     AST   Date Value Ref Range Status   04/18/2023 63 (H) 0 - 45 U/L Final   10/06/2020 22 0 - 45 U/L Final       TROP  No results found for: \"TROPI\", \"TROPONIN\", \"TROPR\", \"TROPN\"    CBC  CBC RESULTS: No results for input(s): \"WBC\", \"RBC\", \"HGB\", \"HCT\", \"MCV\", \"MCH\", \"MCHC\", " "\"RDW\", \"PLT\" in the last 71430 hours.    LIPIDS  Recent Labs   Lab Test 02/14/24  1020 04/18/23  0918   CHOL 159 157   HDL 53 75   LDL 72 61   TRIG 168* 104       TSH  TSH   Date Value Ref Range Status   08/28/2013 3.52 0.4 - 5.0 mU/L Final       HBA1C  No results found for: \"A1C\"      CARDIAC DATA:     Echo 7/9/24:  Left ventricular size, wall motion and function are normal. The ejection  fraction is 55-60%.     The right ventricle is normal size. Global right ventricular function is  normal.     Moderately dilated ascending aorta. Aortic root 4.5 cm, ascending aorta 4.9  cm.     Pulmonary artery systolic pressure is normal.     The inferior vena cava is normal.     No pericardial effusion is present.     Compared to prior study on 1/8/2024 there are no hemodynamically significant  differences.    Stress Test 9/22/23     The nuclear stress test is negative for inducible myocardial ischemia or infarction.     Left ventricular function is normal.     The left ventricular ejection fraction at rest is 80%.    CTA Chest 1/10/25:  CT ANGIOGRAM CHEST the thoracic and visualized abdominal aorta is patent with mild calcified atherosclerotic and atheromatous disease. Normal great vessel branching pattern, the visualized great vessels are widely patent where seen. Dilation of the   aortic root measuring 4.3 cm. The ascending thoracic aorta is also dilated measuring up to 4.9 cm. These findings are similar to 5/1/2023.     LUNGS AND PLEURA: Biapical scarring. Intrapleural lymph node along the right minor fissure. Emphysema. 4 mm nodule in the right upper lobe on series 8 image 77.     MEDIASTINUM/AXILLAE: 6 mm hypodense right thyroid lobe nodule. Mild cardiac medically. No significant pericardial effusion. No bulky lymphadenopathy.     CORONARY ARTERY CALCIFICATION: Moderate.     UPPER ABDOMEN: Normal     MUSCULOSKELETAL: Degenerative disease of both shoulders and visualized cervical spine.    1.  Dilation of the aortic root " and ascending thoracic aorta measuring 4.3 cm and 4.9 cm in diameter respectively. Similar to 5/1/2023.  2.  4 mm pulmonary nodule in the right upper lobe is not significantly changed from 5/1/2023.  3.  Ill-defined 6 mm right thyroid lobe hypodense nodule. Recommend formal evaluation with dedicated thyroid ultrasound.      ASSESSMENT/PLAN:  In summary, Miko Gomez is here today with the following -      # CAD (moderate on 1/10/25 CT)  # Aortic Aneurysm  # Hypertension  # Hyperlipidemia    Blood pressure elevated in clinic today. Not checking at home.    Most recent lipid panel on 2/14/24 with LDL 72, . CTA on 1/10/25 with dilation of the aortic root and ascending thoracic aorta measuring 4.3 cm and 4.9 cm in diameter respectively and similar to 5/1/2023 CT and 7/9/24 echo. Unchanged pulmonary nodule, but new thyroid nodule recommended for further evaluation.    Patient reports no cardiac symptoms in clinic today.    - Continue annual surveillance imaging for aortic aneurysm  - Continue ASA and statin for HLD/CAD  - Take amlodipine 5 mg daily  - Continue lisinopril and atenolol for HTN (bradycardia limits increased BB)  - Check BP daily  - RN BP log check in 3 weeks  - Lifestyle modifications for risk factor management: maintain healthy weight, aerobic and strength exercise with goal of 150 minutes per week, healthy diet with emphasis in fruits, vegetables and fiber, limit alcohol intake, stop smoking, and avoid processed and prepackaged foods       Follow up:  - RN BP check in 3 weeks  - with PCP about incidental findings of thyroid nodule  - in 1 year with echo prior      PREETI Kamara, MATTP-C  [facility] Cardiology  Securely message with SpokenLayer   Text page via WrapMail Paging/IntelliDOTy          Chart review time: 10 minutes  Visit time: 37 minutes  Chart completion/documentation: 4 minutes  Total time spent: 51 minutes       Please do not hesitate to contact me if you have any questions/concerns.      Sincerely,     PREETI Kamara CNP

## 2025-01-14 NOTE — PATIENT INSTRUCTIONS
Thank you for coming to the [facility]; please note the following instructions:    1. Start taking amlodipine 5 mg daily  2. Check blood pressure daily after starting amlodipine  3. RN will call you for your blood pressure in about 3 weeks  4. Follow up with Dr. Ledbetter (PCP) about incidental findings of thyroid nodule  5. Follow up with cardiology in 1 year with echo and lipids prior      If you have any questions regarding your visit please contact your care team:     Cardiology  Telephone Number   Yuliya MEYERS., RN  Margie ZAYAS, RN  Christina GROVES, RN  Kimi DURHAM, RMHELLEN NASCIMENTO, ANABEL LOVE, Clinic Facilitator  Faby ZAYAS, Clinic Facilitator [phone] (option 1)   For scheduling appts:     [phone] (select option 1)       For the Device Clinic (Pacemakers and ICD's)  RN's :  Christi Anderson   During business hours: [phone]    *After business hours:  [phone] (select option 4)      Normal test result notifications will be released via DIY or mailed within 7 business days.  All other test results, will be communicated via telephone once reviewed by your cardiologist.    If you need a medication refill please contact your pharmacy.  Please allow 3 business days for your refill to be completed.    As always, thank you for trusting us with your health care needs!

## 2025-01-16 DIAGNOSIS — E04.1 THYROID NODULE: Primary | ICD-10-CM

## 2025-02-11 ENCOUNTER — PATIENT OUTREACH (OUTPATIENT)
Dept: CARE COORDINATION | Facility: CLINIC | Age: 75
End: 2025-02-11
Payer: MEDICARE

## 2025-02-11 ENCOUNTER — TELEPHONE (OUTPATIENT)
Dept: CARDIOLOGY | Facility: CLINIC | Age: 75
End: 2025-02-11
Payer: MEDICARE

## 2025-02-11 NOTE — TELEPHONE ENCOUNTER
Spoke to patient and discussed BP readings.  Patient states that he has been taking his blood pressure at home but is currently at work and does not recall any of the numbers.  He is scheduled to see Dr. Padron tomorrow and recommends to see what his BP reading is at the appointment.      Writer will postpone this call to view BP reading at primary care appointment.    Yuliya Knight, RN  Cardiology Care Coordinator  [facility]  [phone] option 1

## 2025-02-11 NOTE — TELEPHONE ENCOUNTER
----- Message from Yuliya MEYERS sent at 1/14/2025 10:28 AM CST -----  Check BP in 3 weeks per Regina

## 2025-02-12 ENCOUNTER — OFFICE VISIT (OUTPATIENT)
Dept: FAMILY MEDICINE | Facility: CLINIC | Age: 75
End: 2025-02-12
Payer: MEDICARE

## 2025-02-12 VITALS
DIASTOLIC BLOOD PRESSURE: 80 MMHG | RESPIRATION RATE: 20 BRPM | HEIGHT: 67 IN | HEART RATE: 51 BPM | SYSTOLIC BLOOD PRESSURE: 150 MMHG | BODY MASS INDEX: 24.64 KG/M2 | TEMPERATURE: 96.9 F | WEIGHT: 157 LBS | OXYGEN SATURATION: 97 %

## 2025-02-12 DIAGNOSIS — Z29.11 NEED FOR VACCINATION AGAINST RESPIRATORY SYNCYTIAL VIRUS: ICD-10-CM

## 2025-02-12 DIAGNOSIS — I10 HYPERTENSION GOAL BP (BLOOD PRESSURE) < 140/90: Primary | ICD-10-CM

## 2025-02-12 DIAGNOSIS — E04.1 THYROID NODULE: ICD-10-CM

## 2025-02-12 DIAGNOSIS — I10 ESSENTIAL HYPERTENSION WITH GOAL BLOOD PRESSURE LESS THAN 140/90: ICD-10-CM

## 2025-02-12 PROCEDURE — 90662 IIV NO PRSV INCREASED AG IM: CPT | Performed by: FAMILY MEDICINE

## 2025-02-12 PROCEDURE — 91320 SARSCV2 VAC 30MCG TRS-SUC IM: CPT | Performed by: FAMILY MEDICINE

## 2025-02-12 PROCEDURE — 90480 ADMN SARSCOV2 VAC 1/ONLY CMP: CPT | Performed by: FAMILY MEDICINE

## 2025-02-12 PROCEDURE — 36415 COLL VENOUS BLD VENIPUNCTURE: CPT | Performed by: FAMILY MEDICINE

## 2025-02-12 PROCEDURE — G2211 COMPLEX E/M VISIT ADD ON: HCPCS | Performed by: FAMILY MEDICINE

## 2025-02-12 PROCEDURE — 84443 ASSAY THYROID STIM HORMONE: CPT | Performed by: FAMILY MEDICINE

## 2025-02-12 PROCEDURE — 99214 OFFICE O/P EST MOD 30 MIN: CPT | Performed by: FAMILY MEDICINE

## 2025-02-12 PROCEDURE — G0008 ADMIN INFLUENZA VIRUS VAC: HCPCS | Performed by: FAMILY MEDICINE

## 2025-02-12 PROCEDURE — 80048 BASIC METABOLIC PNL TOTAL CA: CPT | Performed by: FAMILY MEDICINE

## 2025-02-12 RX ORDER — AMLODIPINE BESYLATE 5 MG/1
10 TABLET ORAL DAILY
COMMUNITY
Start: 2025-02-12

## 2025-02-12 ASSESSMENT — PAIN SCALES - GENERAL: PAINLEVEL_OUTOF10: NO PAIN (0)

## 2025-02-12 NOTE — PROGRESS NOTES
ICD-10-CM    1. Hypertension goal BP (blood pressure) < 140/90  I10 BASIC METABOLIC PANEL      2. Need for vaccination against respiratory syncytial virus  Z29.11       3. Thyroid nodule  E04.1 US Thyroid     TSH with free T4 reflex      4. Essential hypertension with goal blood pressure less than 140/90  I10 amLODIPine (NORVASC) 5 MG tablet       PLAN:increase amlodipine to 10 mg(twp 5 mg pills)     Subjective   Robert is a 74 year old, presenting for the following health issues:  Results        2/12/2025     2:56 PM   Additional Questions   Roomed by Chula MENDOSA CMA   Accompanied by PAMELA     History of Present Illness       Reason for visit:  Ct scan discovery  Symptom onset:  1-2 weeks ago  Symptoms include:  Do not know  Symptom intensity:  Mild  Symptom progression:  Staying the same  Had these symptoms before:  No  What makes it worse:  No  What makes it better:  No   He is taking medications regularly.     SUBJECTIVE:  74 year oldyear old male enters with  hypertension.  Pt. Has been compliant with medications and medications were reviewed.  No side effects. No chest pain or sob. Low sodium diet.  Patient had thyroid nodule 6 mm on the right. He has no palpitations hot or cold temperature intolerance.  Current Outpatient Medications:     amLODIPine (NORVASC) 5 MG tablet, Take 2 tablets (10 mg) by mouth daily., Disp: , Rfl:     aspirin 81 MG EC tablet, Take 1 tablet (81 mg) by mouth daily, Disp: 90 tablet, Rfl: 3    atenolol (TENORMIN) 100 MG tablet, TAKE ONE TABLET BY MOUTH ONCE DAILY ++  BLOOD PRESSURE CHECK NEEDED, Disp: 90 tablet, Rfl: 0    atorvastatin (LIPITOR) 40 MG tablet, Take 1 tablet (40 mg) by mouth daily, Disp: 90 tablet, Rfl: 3    EPINEPHrine (ANY BX GENERIC EQUIV) 0.3 MG/0.3ML injection 2-pack, INJECT ONE DEVICE INTO THE MUSCLE AS NEEDED FOR ALLERGIC REACTION, Disp: 2 each, Rfl: 1    lisinopril (ZESTRIL) 40 MG tablet, Take 1 tablet (40 mg) by mouth daily, Disp: 90 tablet, Rfl: 3    Multiple  "Vitamins-Minerals (CENTRUM SILVER) CHEW, Take  by mouth., Disp: , Rfl:     nicotine (NICOTROL) 10 MG inhaler, Use 1 cartridge as needed for urge to smoke by puffing over course of 20min.  Use 6-16 cart/day; reduce number of cart/day over 6-12 weeks. (Patient not taking: Reported on 2/12/2025), Disp: 168 each, Rfl: 3  No current facility-administered medications for this visit.    Facility-Administered Medications Ordered in Other Visits:     sodium chloride (PF) 0.9% PF flush 3 mL, 3 mL, Intracatheter, Q8H, Isai Matthews MD, 3 mL at 07/25/23 1140  Past Medical History:   Diagnosis Date    Coronary artery disease involving native coronary artery of native heart without angina pectoris 07/12/2023    HTN     Hyperlipidemia     Shortness of breath      Ancillary Procedure on 01/10/2025   Component Date Value Ref Range Status    Creatinine POCT 01/10/2025 1.0  0.7 - 1.3 mg/dL Final    GFR, ESTIMATED POCT 01/10/2025 >60  >60 mL/min/1.73m2 Final      Reviewed health maintenance  Patient Active Problem List   Diagnosis    CARDIOVASCULAR SCREENING; LDL GOAL LESS THAN 130    Hypertension goal BP (blood pressure) < 140/90    High cholesterol    Coronary artery disease involving native coronary artery of native heart without angina pectoris    Aneurysm of ascending aorta without rupture    Encounter for smoking cessation counseling    Dyspnea on exertion         OBJECTIVE:  no apparent distress  BP (!) 163/100   Pulse 51   Temp 96.9  F (36.1  C) (Tympanic)   Resp 20   Ht 1.702 m (5' 7.01\")   Wt 71.2 kg (157 lb)   SpO2 97%   BMI 24.58 kg/m       Head: Normocephalic. No masses, lesions, tenderness or abnormalities.  Neck::Neck supple. No adenopathy. Thyroid symmetric, normal size.    Cardiovascular: negative. No lifts, heaves, or thrills. RRR. No murmurs, clicks gallops or rubs  Respiratory. Good diaphragmatic excursion. Lungs clear  Gastrointestinal:Abdomen soft, non-tender.  No masses, organomegaly    Ancillary " Procedure on 01/10/2025   Component Date Value Ref Range Status    Creatinine POCT 01/10/2025 1.0  0.7 - 1.3 mg/dL Final    GFR, ESTIMATED POCT 01/10/2025 >60  >60 mL/min/1.73m2 Final         ICD-10-CM    1. Hypertension goal BP (blood pressure) < 140/90  I10 BASIC METABOLIC PANEL      2. Need for vaccination against respiratory syncytial virus  Z29.11       3. Thyroid nodule  E04.1 US Thyroid     TSH with free T4 reflex      4. Essential hypertension with goal blood pressure less than 140/90  I10 amLODIPine (NORVASC) 5 MG tablet       PLAN: Recheck 1 week             Signed Electronically by: Virgil Ledbetter MD

## 2025-02-13 LAB
ANION GAP SERPL CALCULATED.3IONS-SCNC: 14 MMOL/L (ref 7–15)
BUN SERPL-MCNC: 16.5 MG/DL (ref 8–23)
CALCIUM SERPL-MCNC: 10.2 MG/DL (ref 8.8–10.4)
CHLORIDE SERPL-SCNC: 101 MMOL/L (ref 98–107)
CREAT SERPL-MCNC: 0.87 MG/DL (ref 0.67–1.17)
EGFRCR SERPLBLD CKD-EPI 2021: >90 ML/MIN/1.73M2
GLUCOSE SERPL-MCNC: 72 MG/DL (ref 70–99)
HCO3 SERPL-SCNC: 24 MMOL/L (ref 22–29)
POTASSIUM SERPL-SCNC: 3.7 MMOL/L (ref 3.4–5.3)
SODIUM SERPL-SCNC: 139 MMOL/L (ref 135–145)
TSH SERPL DL<=0.005 MIU/L-ACNC: 1.56 UIU/ML (ref 0.3–4.2)

## 2025-02-13 NOTE — TELEPHONE ENCOUNTER
Amlodipine was increased to 10 mg daily per primary care and follow-up is scheduled in 1 week with primary.  Ok to close this encounter.    Yuliya Knight RN  Cardiology Care Coordinator  [facility]  [phone] option 1

## 2025-02-19 ENCOUNTER — OFFICE VISIT (OUTPATIENT)
Dept: FAMILY MEDICINE | Facility: CLINIC | Age: 75
End: 2025-02-19
Payer: MEDICARE

## 2025-02-19 VITALS
TEMPERATURE: 97.4 F | RESPIRATION RATE: 20 BRPM | HEIGHT: 67 IN | OXYGEN SATURATION: 97 % | BODY MASS INDEX: 24.64 KG/M2 | HEART RATE: 56 BPM | DIASTOLIC BLOOD PRESSURE: 80 MMHG | SYSTOLIC BLOOD PRESSURE: 126 MMHG | WEIGHT: 157 LBS

## 2025-02-19 DIAGNOSIS — I10 HYPERTENSION GOAL BP (BLOOD PRESSURE) < 140/90: Primary | ICD-10-CM

## 2025-02-19 PROCEDURE — 99213 OFFICE O/P EST LOW 20 MIN: CPT | Performed by: FAMILY MEDICINE

## 2025-02-19 RX ORDER — AMLODIPINE BESYLATE 10 MG/1
10 TABLET ORAL DAILY
Qty: 90 TABLET | Refills: 1 | Status: SHIPPED | OUTPATIENT
Start: 2025-02-19

## 2025-02-19 ASSESSMENT — PAIN SCALES - GENERAL: PAINLEVEL_OUTOF10: NO PAIN (0)

## 2025-02-19 NOTE — PROGRESS NOTES
ICD-10-CM    1. Hypertension goal BP (blood pressure) < 140/90  I10 amLODIPine (NORVASC) 10 MG tablet       PLAN:Follow up in 1 year       Subjective   Robert is a 74 year old, presenting for the following health issues:  Hypertension        2/19/2025     3:10 PM   Additional Questions   Roomed by Chula MENDOSA CMA   Accompanied by PAMELA     History of Present Illness       Reason for visit:  Ct scan discovery  Symptom onset:  1-2 weeks ago  Symptoms include:  Do not know  Symptom intensity:  Mild  Symptom progression:  Staying the same  Had these symptoms before:  No  What makes it worse:  No  What makes it better:  No   He is taking medications regularly.     SUBJECTIVE:  74 year oldyear old male enters with  hypertension.  Pt. Has been compliant with medications and medications were reviewed.  No side effects. No chest pain or sob. Low sodium diet.    Current Outpatient Medications:     amLODIPine (NORVASC) 10 MG tablet, Take 1 tablet (10 mg) by mouth daily., Disp: 90 tablet, Rfl: 1    amLODIPine (NORVASC) 5 MG tablet, Take 2 tablets (10 mg) by mouth daily., Disp: , Rfl:     aspirin 81 MG EC tablet, Take 1 tablet (81 mg) by mouth daily, Disp: 90 tablet, Rfl: 3    atenolol (TENORMIN) 100 MG tablet, TAKE ONE TABLET BY MOUTH ONCE DAILY ++  BLOOD PRESSURE CHECK NEEDED, Disp: 90 tablet, Rfl: 0    atorvastatin (LIPITOR) 40 MG tablet, Take 1 tablet (40 mg) by mouth daily, Disp: 90 tablet, Rfl: 3    EPINEPHrine (ANY BX GENERIC EQUIV) 0.3 MG/0.3ML injection 2-pack, INJECT ONE DEVICE INTO THE MUSCLE AS NEEDED FOR ALLERGIC REACTION, Disp: 2 each, Rfl: 1    lisinopril (ZESTRIL) 40 MG tablet, Take 1 tablet (40 mg) by mouth daily, Disp: 90 tablet, Rfl: 3    Multiple Vitamins-Minerals (CENTRUM SILVER) CHEW, Take  by mouth., Disp: , Rfl:   No current facility-administered medications for this visit.    Facility-Administered Medications Ordered in Other Visits:     sodium chloride (PF) 0.9% PF flush 3 mL, 3 mL, Intracatheter, Q8H,  "Isai Matthews MD, 3 mL at 07/25/23 1140  Past Medical History:   Diagnosis Date    Coronary artery disease involving native coronary artery of native heart without angina pectoris 07/12/2023    HTN     Hyperlipidemia     Shortness of breath      Office Visit on 02/12/2025   Component Date Value Ref Range Status    Sodium 02/12/2025 139  135 - 145 mmol/L Final    Potassium 02/12/2025 3.7  3.4 - 5.3 mmol/L Final    Chloride 02/12/2025 101  98 - 107 mmol/L Final    Carbon Dioxide (CO2) 02/12/2025 24  22 - 29 mmol/L Final    Anion Gap 02/12/2025 14  7 - 15 mmol/L Final    Urea Nitrogen 02/12/2025 16.5  8.0 - 23.0 mg/dL Final    Creatinine 02/12/2025 0.87  0.67 - 1.17 mg/dL Final    GFR Estimate 02/12/2025 >90  >60 mL/min/1.73m2 Final    eGFR calculated using 2021 CKD-EPI equation.    Calcium 02/12/2025 10.2  8.8 - 10.4 mg/dL Final    Glucose 02/12/2025 72  70 - 99 mg/dL Final    TSH 02/12/2025 1.56  0.30 - 4.20 uIU/mL Final      Reviewed health maintenance  Patient Active Problem List   Diagnosis    CARDIOVASCULAR SCREENING; LDL GOAL LESS THAN 130    Hypertension goal BP (blood pressure) < 140/90    High cholesterol    Coronary artery disease involving native coronary artery of native heart without angina pectoris    Aneurysm of ascending aorta without rupture    Encounter for smoking cessation counseling    Dyspnea on exertion         OBJECTIVE:  no apparent distress  /80   Pulse 56   Temp 97.4  F (36.3  C) (Tympanic)   Resp 20   Ht 1.702 m (5' 7.01\")   Wt 71.2 kg (157 lb)   SpO2 97%   BMI 24.58 kg/m       Head: Normocephalic. No masses, lesions, tenderness or abnormalities.  Neck::Neck supple. No adenopathy. Thyroid symmetric, normal size.    Cardiovascular: negative. No lifts, heaves, or thrills. RRR. No murmurs, clicks gallops or rubs  Respiratory. Good diaphragmatic excursion. Lungs clear  Gastrointestinal:Abdomen soft, non-tender.  No masses, organomegaly    Office Visit on 02/12/2025   Component " Date Value Ref Range Status    Sodium 02/12/2025 139  135 - 145 mmol/L Final    Potassium 02/12/2025 3.7  3.4 - 5.3 mmol/L Final    Chloride 02/12/2025 101  98 - 107 mmol/L Final    Carbon Dioxide (CO2) 02/12/2025 24  22 - 29 mmol/L Final    Anion Gap 02/12/2025 14  7 - 15 mmol/L Final    Urea Nitrogen 02/12/2025 16.5  8.0 - 23.0 mg/dL Final    Creatinine 02/12/2025 0.87  0.67 - 1.17 mg/dL Final    GFR Estimate 02/12/2025 >90  >60 mL/min/1.73m2 Final    eGFR calculated using 2021 CKD-EPI equation.    Calcium 02/12/2025 10.2  8.8 - 10.4 mg/dL Final    Glucose 02/12/2025 72  70 - 99 mg/dL Final    TSH 02/12/2025 1.56  0.30 - 4.20 uIU/mL Final           Signed Electronically by: Virgil Ledbetter MD

## 2025-02-25 ENCOUNTER — ANCILLARY PROCEDURE (OUTPATIENT)
Dept: ULTRASOUND IMAGING | Facility: CLINIC | Age: 75
End: 2025-02-25
Attending: FAMILY MEDICINE
Payer: MEDICARE

## 2025-02-25 DIAGNOSIS — E04.1 THYROID NODULE: ICD-10-CM

## 2025-02-25 PROCEDURE — 76536 US EXAM OF HEAD AND NECK: CPT | Mod: TC | Performed by: RADIOLOGY

## 2025-03-07 ENCOUNTER — TELEPHONE (OUTPATIENT)
Dept: FAMILY MEDICINE | Facility: CLINIC | Age: 75
End: 2025-03-07
Payer: MEDICARE

## 2025-03-07 NOTE — TELEPHONE ENCOUNTER
Patient requesting Ultrasound results in letter format-  printed results but patient hoping for clearer explanation if possible. Are you able to put this in result letters possibly?      Celio Mccabe

## 2025-03-07 NOTE — LETTER
March 10, 2025      Robert Gomez  [id number] [address] [place] [id number]        Dear ,    We are writing to inform you of your test results.  The results show several nodules that do not need to be rechecked for at least one year by ultrasound.  At this time they do not appear to be malignant. This is a fairly common finding in people.         IMPRESSION:  1.  Heterogeneous thyroid gland. Consider ultrasound follow-up in 1 year for a 1.1 cm nodule in the inferior right lobe of the thyroid gland.           If you have any questions or concerns, please call the clinic at the number listed above.       Sincerely,          Virgli Ledbetter MD

## 2025-03-07 NOTE — LETTER
March 10, 2025    Miko Gomez  [id number] [address] [place] [id number]      Dear Miko,    The results show several nodules that do not need to be rechecked for at least one year by ultrasound.  At this time they do not appear to be malignant. This is a fairly common finding in people.     Virgil Ledbetter MD/rony              Electronically signed

## 2025-03-07 NOTE — TELEPHONE ENCOUNTER
Can you put the results and your note below in a letter format so we can send it to the patient please?     Kerline MENDOSA,    [facility]

## 2025-03-07 NOTE — TELEPHONE ENCOUNTER
The results show several nodules that do not need to be rechecked for at least one year by ultrasound.  At this time they do not appear to be malignant. This is a fairly common finding in people.

## 2025-03-18 ENCOUNTER — PATIENT OUTREACH (OUTPATIENT)
Dept: FAMILY MEDICINE | Facility: CLINIC | Age: 75
End: 2025-03-18
Payer: MEDICARE

## 2025-03-18 NOTE — TELEPHONE ENCOUNTER
Patient Quality Outreach    Patient is due for the following:   Physical Annual Wellness Visit    Action(s) Taken:   Schedule a Annual Wellness Visit    Type of outreach:    Sent [employer] message.    Questions for provider review:    None           Madelaine Frederick MA

## 2025-04-01 ENCOUNTER — TELEPHONE (OUTPATIENT)
Dept: CARDIOLOGY | Facility: CLINIC | Age: 75
End: 2025-04-01
Payer: MEDICARE

## 2025-04-01 NOTE — TELEPHONE ENCOUNTER
Health Call Center    Phone Message    May a detailed message be left on voicemail: yes     Reason for Call: Other: pt would like a call back regarding his existing aortic aneurysm with possible surgery in the future. Pt was just on phone with [employer] insurance and they are requiring some follow up questions since they are claiming he shouldn't be under heart disease with the dx he has currently. Pt is currently is being denied due to this. Please call pt back to discuss.       Action Taken: Other: cardiology     Travel Screening: Not Applicable      Thank you!  [facility]

## 2025-04-02 NOTE — TELEPHONE ENCOUNTER
"Spoke to patient.  Patient having questions regarding his [facility] insurance and he is being told that his aortic aneurysm are not considered heart disease and now he does not qualify for cash reimbursement.    Reviewed chart with patient.  Explained he is due to be seen in Jan 2026 for echo and follow-up.  This is scheudled with Regina Portillo NP.  Patient is requesting to be seen by an MD to discuss his aneurysm.  Writer was on the phone for a extended amount of time with patient, patient explaining that he may die all of a sudden and now \"it is not related to my heart according to [employer] and now I would not get reimbursed\" patient requesting to talk to an MD about this matter.  He prefers Dr. Matthews but no availability in clinic at [place] any more.  Patient prefers [place] location.  Writer offered first available with Dr. Alo Weaver in August and patient accepted.    Yuliya Knight, KYLAH  Cardiology Care Coordinator  [facility]  [phone] option 1    "

## 2025-05-28 ENCOUNTER — NURSE TRIAGE (OUTPATIENT)
Dept: FAMILY MEDICINE | Facility: CLINIC | Age: 75
End: 2025-05-28

## 2025-05-28 ENCOUNTER — OFFICE VISIT (OUTPATIENT)
Dept: FAMILY MEDICINE | Facility: CLINIC | Age: 75
End: 2025-05-28
Payer: MEDICARE

## 2025-05-28 VITALS
WEIGHT: 154 LBS | DIASTOLIC BLOOD PRESSURE: 85 MMHG | RESPIRATION RATE: 20 BRPM | TEMPERATURE: 97.6 F | HEIGHT: 67 IN | SYSTOLIC BLOOD PRESSURE: 120 MMHG | BODY MASS INDEX: 24.17 KG/M2 | HEART RATE: 64 BPM | OXYGEN SATURATION: 99 %

## 2025-05-28 DIAGNOSIS — H81.10 BENIGN PAROXYSMAL POSITIONAL VERTIGO, UNSPECIFIED LATERALITY: Primary | ICD-10-CM

## 2025-05-28 PROCEDURE — 3074F SYST BP LT 130 MM HG: CPT | Performed by: FAMILY MEDICINE

## 2025-05-28 PROCEDURE — 1126F AMNT PAIN NOTED NONE PRSNT: CPT | Performed by: FAMILY MEDICINE

## 2025-05-28 PROCEDURE — 99214 OFFICE O/P EST MOD 30 MIN: CPT | Performed by: FAMILY MEDICINE

## 2025-05-28 PROCEDURE — 3079F DIAST BP 80-89 MM HG: CPT | Performed by: FAMILY MEDICINE

## 2025-05-28 ASSESSMENT — PAIN SCALES - GENERAL: PAINLEVEL_OUTOF10: NO PAIN (0)

## 2025-05-28 NOTE — TELEPHONE ENCOUNTER
" transferred patient to triage. OV scheduled today at 2 PM with Dr. Ledbetter.    For the last three days, in the morning, patient experienced dizziness and vertigo including today.  \"As the day goes by, it gets better.\"    OV scheduled later today by a .  Patient will call around for transportation.    Patient does not want to go to ED via ambulance.  \"I almost  in ambulance last time.\"    Reason for Disposition   MODERATE dizziness (e.g., interferes with normal activities)  (Exception: Dizziness caused by heat exposure, sudden standing, or poor fluid intake.)    Additional Information   Negative: SEVERE difficulty breathing (e.g., struggling for each breath, speaks in single words)   Negative: Shock suspected (e.g., cold/pale/clammy skin, too weak to stand, low BP, rapid pulse)   Negative: Difficult to awaken or acting confused (e.g., disoriented, slurred speech)   Negative: Fainted, and still feels dizzy afterwards   Negative: Overdose (accidental or intentional) of medications   Negative: New neurologic deficit that is present now: * Weakness of the face, arm, or leg on one side of the body * Numbness of the face, arm, or leg on one side of the body * Loss of speech or garbled speech   Negative: Heart beating < 50 beats per minute OR > 140 beats per minute   Negative: Sounds like a life-threatening emergency to the triager   Negative: Chest pain   Negative: Rectal bleeding, bloody stool, or tarry-black stool   Negative: Vomiting is main symptom   Negative: Diarrhea is main symptom   Negative: Headache is main symptom   Negative: Heat exhaustion suspected (i.e., dehydration from heat exposure)   Negative: Patient states that they are having an anxiety or panic attack   Negative: Dizziness from low blood sugar (i.e., < 60 mg/dl or 3.5 mmol/l)   Negative: SEVERE dizziness (e.g., unable to stand, requires support to walk, feels like passing out now)   Negative: SEVERE headache or neck pain   " "Negative: Spinning or tilting sensation (vertigo) present now and one or more stroke risk factors (i.e., hypertension, diabetes mellitus, prior stroke/TIA, heart attack, age over 60) (Exception: Prior physician evaluation for this AND no different/worse than usual.)   Negative: Neurologic deficit that was brief (now gone), ANY of the following:* Weakness of the face, arm, or leg on one side of the body* Numbness of the face, arm, or leg on one side of the body* Loss of speech or garbled speech   Negative: Loss of vision or double vision  (Exception: Similar to previous migraines.)   Negative: Extra heartbeats, irregular heart beating, or heart is beating very fast (i.e., 'palpitations')   Negative: Difficulty breathing   Negative: Drinking very little and dehydration suspected (e.g., no urine > 12 hours, very dry mouth, very lightheaded)   Negative: Follows bleeding (e.g., stomach, rectum, vagina)  (Exception: Became dizzy from sight of small amount blood.)   Negative: Patient sounds very sick or weak to the triager   Negative: Lightheadedness (dizziness) present now, after 2 hours of rest and fluids   Negative: Spinning or tilting sensation (vertigo) present now   Negative: Fever > 103 F (39.4 C)   Negative: Fever > 100.0 F (37.8 C) and has diabetes mellitus or a weak immune system (e.g., HIV positive, cancer chemotherapy, organ transplant, splenectomy, chronic steroids)    Answer Assessment - Initial Assessment Questions  1. DESCRIPTION: \"Describe your dizziness.\"      30 seconds episodes of vertigo seven episodes in 24 hrs  2. LIGHTHEADED: \"Do you feel lightheaded?\" (e.g., somewhat faint, woozy, weak upon standing)      yes  3. VERTIGO: \"Do you feel like either you or the room is spinning or tilting?\" (i.e. vertigo)      yes  4. SEVERITY: \"How bad is it?\"  \"Do you feel like you are going to faint?\" \"Can you stand and walk?\"    - MILD: Feels slightly dizzy, but walking normally.    - MODERATE: Feels unsteady when " "walking, but not falling; interferes with normal activities (e.g., school, work).    - SEVERE: Unable to walk without falling, or requires assistance to walk without falling; feels like passing out now.       moderate  5. ONSET:  \"When did the dizziness begin?\"      3 days ago  6. AGGRAVATING FACTORS: \"Does anything make it worse?\" (e.g., standing, change in head position)      Standing, change the position  7. HEART RATE: \"Can you tell me your heart rate?\" \"How many beats in 15 seconds?\"  (Note: not all patients can do this)        no  8. CAUSE: \"What do you think is causing the dizziness?\"      NA  9. RECURRENT SYMPTOM: \"Have you had dizziness before?\" If Yes, ask: \"When was the last time?\" \"What happened that time?\"      no  10. OTHER SYMPTOMS: \"Do you have any other symptoms?\" (e.g., fever, chest pain, vomiting, diarrhea, bleeding)        Within the past week had a mild chest pain  11. PREGNANCY: \"Is there any chance you are pregnant?\" \"When was your last menstrual period?\"        NA    Protocols used: Dizziness-A-OH    Dee Dee Bender RN, BSN  [facility]    "

## 2025-05-28 NOTE — PROGRESS NOTES
"    ICD-10-CM    1. Benign paroxysmal positional vertigo, unspecified laterality  H81.10        PLAN:[place] report     Subjective   Robert is a 74 year old, presenting for the following health issues:  Dizziness  SUBJECTIVE:  74 year old.The patient has a complaint of dizzy.  This started 3 days ago. Associated symptoms are slight nausea.  The symptoms last 30 seconds from beginning to end. .  Brought on by waking up in the am. .  Better with time. ROS no increase in trinitus which he has all the time      Reviewed health maintenance  Patient Active Problem List   Diagnosis    CARDIOVASCULAR SCREENING; LDL GOAL LESS THAN 130    Hypertension goal BP (blood pressure) < 140/90    High cholesterol    Coronary artery disease involving native coronary artery of native heart without angina pectoris    Aneurysm of ascending aorta without rupture    Encounter for smoking cessation counseling    Dyspnea on exertion     Past Medical History:   Diagnosis Date    Coronary artery disease involving native coronary artery of native heart without angina pectoris 07/12/2023    HTN     Hyperlipidemia     Shortness of breath        OBJECTIVE:  no apparent distress  /85   Pulse 64   Temp 97.6  F (36.4  C) (Tympanic)   Resp 20   Ht 1.702 m (5' 7.01\")   Wt 69.9 kg (154 lb)   SpO2 99%   BMI 24.11 kg/m     PERRLA and EOM intact  Bilateral nystagmus  LUNGS:  CTA B/L, no wheezing or crackles.   Cardiovascular: negative, PMI normal. No lifts, heaves, or thrills. RRR. No murmurs, clicks gallops or rub      ICD-10-CM    1. Benign paroxysmal positional vertigo, unspecified laterality  H81.10        PLAN: Recheck 1 week           5/28/2025     2:23 PM   Additional Questions   Roomed by Chula MENDOSA CMA   Accompanied by PAMELA     History of Present Illness       Reason for visit:  Dizzy  Symptom onset:  1-2 weeks ago   He is taking medications regularly.      Signed Electronically by: Virgil Ledbetter MD    "

## 2025-06-14 ENCOUNTER — HEALTH MAINTENANCE LETTER (OUTPATIENT)
Age: 75
End: 2025-06-14

## 2025-07-16 DIAGNOSIS — I10 ESSENTIAL HYPERTENSION WITH GOAL BLOOD PRESSURE LESS THAN 140/90: ICD-10-CM

## 2025-07-16 RX ORDER — ATENOLOL 100 MG/1
TABLET ORAL
Qty: 90 TABLET | Refills: 2 | Status: SHIPPED | OUTPATIENT
Start: 2025-07-16

## 2025-08-11 ENCOUNTER — OFFICE VISIT (OUTPATIENT)
Dept: CARDIOLOGY | Facility: CLINIC | Age: 75
End: 2025-08-11
Payer: MEDICARE

## 2025-08-11 VITALS
OXYGEN SATURATION: 98 % | DIASTOLIC BLOOD PRESSURE: 89 MMHG | WEIGHT: 150 LBS | SYSTOLIC BLOOD PRESSURE: 135 MMHG | HEART RATE: 73 BPM | BODY MASS INDEX: 23.49 KG/M2

## 2025-08-11 DIAGNOSIS — I10 ESSENTIAL HYPERTENSION WITH GOAL BLOOD PRESSURE LESS THAN 140/90: ICD-10-CM

## 2025-08-11 DIAGNOSIS — R93.1 HIGH CORONARY ARTERY CALCIUM SCORE: ICD-10-CM

## 2025-08-11 DIAGNOSIS — I71.21 ANEURYSM OF ASCENDING AORTA WITHOUT RUPTURE: ICD-10-CM

## 2025-08-11 DIAGNOSIS — R07.89 CHEST DISCOMFORT: Primary | ICD-10-CM

## 2025-08-11 DIAGNOSIS — R06.09 DYSPNEA ON EXERTION: ICD-10-CM

## 2025-08-11 DIAGNOSIS — F17.200 CURRENT SMOKER: ICD-10-CM

## 2025-08-11 PROCEDURE — 99213 OFFICE O/P EST LOW 20 MIN: CPT | Performed by: INTERNAL MEDICINE

## 2025-08-11 PROCEDURE — 3075F SYST BP GE 130 - 139MM HG: CPT | Performed by: INTERNAL MEDICINE

## 2025-08-11 PROCEDURE — 3079F DIAST BP 80-89 MM HG: CPT | Performed by: INTERNAL MEDICINE

## 2025-08-18 DIAGNOSIS — E78.00 HIGH CHOLESTEROL: ICD-10-CM

## 2025-08-23 DIAGNOSIS — I10 ESSENTIAL HYPERTENSION WITH GOAL BLOOD PRESSURE LESS THAN 140/90: ICD-10-CM

## 2025-08-25 RX ORDER — ATORVASTATIN CALCIUM 40 MG/1
40 TABLET, FILM COATED ORAL DAILY
Qty: 90 TABLET | Refills: 0 | Status: SHIPPED | OUTPATIENT
Start: 2025-08-25 | End: 2025-08-25

## 2025-08-25 RX ORDER — ATORVASTATIN CALCIUM 40 MG/1
40 TABLET, FILM COATED ORAL DAILY
Qty: 90 TABLET | Refills: 3 | Status: SHIPPED | OUTPATIENT
Start: 2025-08-25

## 2025-08-26 RX ORDER — LISINOPRIL 40 MG/1
40 TABLET ORAL DAILY
Qty: 90 TABLET | Refills: 3 | Status: SHIPPED | OUTPATIENT
Start: 2025-08-26